# Patient Record
Sex: FEMALE | Race: WHITE | NOT HISPANIC OR LATINO | Employment: OTHER | URBAN - METROPOLITAN AREA
[De-identification: names, ages, dates, MRNs, and addresses within clinical notes are randomized per-mention and may not be internally consistent; named-entity substitution may affect disease eponyms.]

---

## 2017-01-05 NOTE — TELEPHONE ENCOUNTER
clopidogrel (PLAVIX) 75 MG tablet      Last Written Prescription Date: 4/6/16  Last Fill Quantity: 90, # refills: 3    Last Office Visit with [facility], [facility] or [facility] prescribing provider:  5/13/16   Future Office Visit:       WBC      7.2   4/6/2016  RBC     4.57   4/6/2016  HGB     14.4   4/6/2016  HCT     43.8   4/6/2016  No components found with this name: mct  MCV       96   4/6/2016  MCH     31.5   4/6/2016  MCHC     32.9   4/6/2016  RDW     13.9   4/6/2016  PLT      278   4/6/2016  AST       15   4/6/2016  ALT       22   4/6/2016  CREATININE   Date Value Ref Range Status   04/06/2016 0.90 0.52 - 1.04 mg/dL Final   ]    atorvastatin (LIPITOR) 40 MG tablet     Last Written Prescription Date: 4/6/16  Last Fill Quantity: 90, # refills: 3  Last Office Visit with [facility], [facility] or [facility] prescribing provider: 5/3/16       CHOL      198   4/6/2016  HDL       77   4/6/2016  LDL      100   4/6/2016  TRIG      104   4/6/2016  CHOLHDLRATIO      3.0   11/19/2014    Yvonne BROWN

## 2017-01-06 RX ORDER — ATORVASTATIN CALCIUM 40 MG/1
TABLET, FILM COATED ORAL
Qty: 90 TABLET | Refills: 3 | OUTPATIENT
Start: 2017-01-06

## 2017-01-06 RX ORDER — CLOPIDOGREL BISULFATE 75 MG/1
TABLET ORAL
Qty: 90 TABLET | Refills: 3 | OUTPATIENT
Start: 2017-01-06

## 2017-01-06 NOTE — TELEPHONE ENCOUNTER
"Have refill supply until April 2017  Refill available at pharmacy.  Request refused as \"duplicate\" sent back to pharmacy.      Laura Hutchinson RN        "

## 2017-03-28 ENCOUNTER — APPOINTMENT (OUTPATIENT)
Dept: MRI IMAGING | Facility: CLINIC | Age: 77
End: 2017-03-28
Attending: EMERGENCY MEDICINE
Payer: MEDICARE

## 2017-03-28 ENCOUNTER — HOSPITAL ENCOUNTER (EMERGENCY)
Facility: CLINIC | Age: 77
Discharge: HOME | End: 2017-03-28
Attending: EMERGENCY MEDICINE | Admitting: EMERGENCY MEDICINE
Payer: MEDICARE

## 2017-03-28 VITALS
TEMPERATURE: 97.8 F | HEART RATE: 69 BPM | RESPIRATION RATE: 14 BRPM | DIASTOLIC BLOOD PRESSURE: 67 MMHG | SYSTOLIC BLOOD PRESSURE: 114 MMHG | OXYGEN SATURATION: 99 %

## 2017-03-28 DIAGNOSIS — R19.7 DIARRHEA, UNSPECIFIED TYPE: ICD-10-CM

## 2017-03-28 DIAGNOSIS — G45.9 TRANSIENT CEREBRAL ISCHEMIA, UNSPECIFIED TYPE: ICD-10-CM

## 2017-03-28 DIAGNOSIS — R55 VAGAL REACTION: ICD-10-CM

## 2017-03-28 LAB
ALBUMIN SERPL-MCNC: 3.4 G/DL (ref 3.4–5)
ALP SERPL-CCNC: 68 U/L (ref 40–150)
ALT SERPL W P-5'-P-CCNC: 28 U/L (ref 0–50)
ANION GAP SERPL CALCULATED.3IONS-SCNC: 10 MMOL/L (ref 3–14)
AST SERPL W P-5'-P-CCNC: 25 U/L (ref 0–45)
BASOPHILS # BLD AUTO: 0 10E9/L (ref 0–0.2)
BASOPHILS NFR BLD AUTO: 0.2 %
BILIRUB SERPL-MCNC: 0.5 MG/DL (ref 0.2–1.3)
BUN SERPL-MCNC: 16 MG/DL (ref 7–30)
CALCIUM SERPL-MCNC: 8.6 MG/DL (ref 8.5–10.1)
CHLORIDE SERPL-SCNC: 105 MMOL/L (ref 94–109)
CO2 SERPL-SCNC: 25 MMOL/L (ref 20–32)
COHGB MFR BLD: 1 % (ref 0–2)
CREAT SERPL-MCNC: 1.07 MG/DL (ref 0.52–1.04)
DIFFERENTIAL METHOD BLD: ABNORMAL
EOSINOPHIL # BLD AUTO: 0.1 10E9/L (ref 0–0.7)
EOSINOPHIL NFR BLD AUTO: 1 %
ERYTHROCYTE [DISTWIDTH] IN BLOOD BY AUTOMATED COUNT: 14.3 % (ref 10–15)
GFR SERPL CREATININE-BSD FRML MDRD: 50 ML/MIN/1.7M2
GLUCOSE SERPL-MCNC: 106 MG/DL (ref 70–99)
HCT VFR BLD AUTO: 37.5 % (ref 35–47)
HGB BLD-MCNC: 12 G/DL (ref 11.7–15.7)
IMM GRANULOCYTES # BLD: 0 10E9/L (ref 0–0.4)
IMM GRANULOCYTES NFR BLD: 0.5 %
LACTATE BLD-SCNC: 1.6 MMOL/L (ref 0.7–2.1)
LYMPHOCYTES # BLD AUTO: 0.6 10E9/L (ref 0.8–5.3)
LYMPHOCYTES NFR BLD AUTO: 10.1 %
MCH RBC QN AUTO: 28.7 PG (ref 26.5–33)
MCHC RBC AUTO-ENTMCNC: 32 G/DL (ref 31.5–36.5)
MCV RBC AUTO: 90 FL (ref 78–100)
MONOCYTES # BLD AUTO: 0.5 10E9/L (ref 0–1.3)
MONOCYTES NFR BLD AUTO: 8.2 %
NEUTROPHILS # BLD AUTO: 5 10E9/L (ref 1.6–8.3)
NEUTROPHILS NFR BLD AUTO: 80 %
NRBC # BLD AUTO: 0 10*3/UL
NRBC BLD AUTO-RTO: 0 /100
PLATELET # BLD AUTO: 284 10E9/L (ref 150–450)
POTASSIUM SERPL-SCNC: 3.8 MMOL/L (ref 3.4–5.3)
PROT SERPL-MCNC: 7.1 G/DL (ref 6.8–8.8)
RBC # BLD AUTO: 4.18 10E12/L (ref 3.8–5.2)
SODIUM SERPL-SCNC: 140 MMOL/L (ref 133–144)
TROPONIN I SERPL-MCNC: NORMAL UG/L (ref 0–0.04)
WBC # BLD AUTO: 6.2 10E9/L (ref 4–11)

## 2017-03-28 PROCEDURE — 25000128 H RX IP 250 OP 636: Performed by: EMERGENCY MEDICINE

## 2017-03-28 PROCEDURE — 70553 MRI BRAIN STEM W/O & W/DYE: CPT

## 2017-03-28 PROCEDURE — 85025 COMPLETE CBC W/AUTO DIFF WBC: CPT | Performed by: EMERGENCY MEDICINE

## 2017-03-28 PROCEDURE — 84484 ASSAY OF TROPONIN QUANT: CPT | Performed by: EMERGENCY MEDICINE

## 2017-03-28 PROCEDURE — A9585 GADOBUTROL INJECTION: HCPCS | Performed by: RADIOLOGY

## 2017-03-28 PROCEDURE — 99285 EMERGENCY DEPT VISIT HI MDM: CPT | Mod: 25

## 2017-03-28 PROCEDURE — 93005 ELECTROCARDIOGRAM TRACING: CPT

## 2017-03-28 PROCEDURE — 82375 ASSAY CARBOXYHB QUANT: CPT | Performed by: EMERGENCY MEDICINE

## 2017-03-28 PROCEDURE — 80053 COMPREHEN METABOLIC PANEL: CPT | Performed by: EMERGENCY MEDICINE

## 2017-03-28 PROCEDURE — 36415 COLL VENOUS BLD VENIPUNCTURE: CPT | Performed by: EMERGENCY MEDICINE

## 2017-03-28 PROCEDURE — 70549 MR ANGIOGRAPH NECK W/O&W/DYE: CPT

## 2017-03-28 PROCEDURE — 25500064 ZZH RX 255 OP 636: Performed by: RADIOLOGY

## 2017-03-28 PROCEDURE — 70544 MR ANGIOGRAPHY HEAD W/O DYE: CPT | Mod: XS

## 2017-03-28 PROCEDURE — 96360 HYDRATION IV INFUSION INIT: CPT | Mod: 59

## 2017-03-28 RX ORDER — SODIUM CHLORIDE 9 MG/ML
1000 INJECTION, SOLUTION INTRAVENOUS CONTINUOUS
Status: DISCONTINUED | OUTPATIENT
Start: 2017-03-28 | End: 2017-03-28 | Stop reason: HOSPADM

## 2017-03-28 RX ORDER — GADOBUTROL 604.72 MG/ML
10 INJECTION INTRAVENOUS ONCE
Status: COMPLETED | OUTPATIENT
Start: 2017-03-28 | End: 2017-03-28

## 2017-03-28 RX ADMIN — GADOBUTROL 10 ML: 604.72 INJECTION INTRAVENOUS at 13:24

## 2017-03-28 RX ADMIN — SODIUM CHLORIDE 1000 ML: 9 INJECTION, SOLUTION INTRAVENOUS at 12:27

## 2017-03-28 ASSESSMENT — ENCOUNTER SYMPTOMS
COUGH: 1
NAUSEA: 0
CHILLS: 1
DIZZINESS: 1
HEADACHES: 0
ABDOMINAL PAIN: 1
VOMITING: 0
SHORTNESS OF BREATH: 0
DIARRHEA: 1

## 2017-03-28 NOTE — DISCHARGE INSTRUCTIONS
What is a TIA?  A TIA (Transient Ischemic Attack) is an early warning that a stroke (also called a brain attack) is coming. A TIA is a temporary stroke. It causes no lasting damage. But the effects of a stroke, if it happens, can be very serious and lasting. If you think you are having symptoms of a TIA or stroke--even if they don t last--get medical help right away.     If you have any symptoms of a TIA or stroke, call 911 and your doctor as soon as possible.     Symptoms of TIA and stroke  Symptoms may come on suddenly and last for a few seconds or a few hours. You may have symptoms only once. Or they may come and go for days. If you notice any of the following symptoms, don t wait. Call 911 or emergency services right away.    Weakness, numbness, tingling, or loss of feeling in your face, arm, or leg.    Trouble seeing in one or both eyes; double vision.    Slurred speech, trouble talking, or problems understanding others when they speak    Sudden, severe headache    Dizziness or a feeling of spinning    Loss of balance or falling    Blackouts    4822-5380 [employer]. [address] [place] [address]. All rights reserved. This information is not intended as a substitute for professional medical care. Always follow your healthcare professional's instructions.

## 2017-03-28 NOTE — ED AVS SNAPSHOT
[facility]    [address]    [facility] [address]    Phone:  [phone]    Fax:  [phone]                                       Julieta Holm   MRN: [id number]    Department:  [facility]   Date of Visit:  3/28/2017           After Visit Summary Signature Page     I have received my discharge instructions, and my questions have been answered. I have discussed any challenges I see with this plan with the nurse or doctor.    ..........................................................................................................................................  Patient/Patient Representative Signature      ..........................................................................................................................................  Patient Representative Print Name and Relationship to Patient    ..................................................               ................................................  Date                                            Time    ..........................................................................................................................................  Reviewed by Signature/Title    ...................................................              ..............................................  Date                                                            Time

## 2017-03-28 NOTE — ED NOTES
ABC's intact.  Alert and oriented x4.    Pt states she was having BM causing some dizziness.  Denies LOC.  Pt states this has happened before, but this time lost sight in L eye for 2-5 minutes.  Pt states all symptoms have resolved at this time.

## 2017-03-28 NOTE — ED AVS SNAPSHOT
[facility]    [address] [place] [address]    Phone:  [phone]    Fax:  [phone]                                       Julieta Holm   MRN: [id number]    Department:  [facility]   Date of Visit:  3/28/2017           Patient Information     Date Of Birth          1940        Your diagnoses for this visit were:     Transient cerebral ischemia, unspecified type     Diarrhea, unspecified type     Vagal reaction        You were seen by Billy Salazar MD.      Follow-up Information     Follow up with Inez Wallis PA-C In 1 day.    Specialty:  Physician Assistant    Contact information:    [facility]  [address] [facility] [id number] [phone]          Follow up with Anette Kenney MD In 3 days.    Specialty:  Neurology    Contact information:    [facility]  [address]  [facility] [id number] [phone]          Discharge Instructions         What is a TIA?  A TIA (Transient Ischemic Attack) is an early warning that a stroke (also called a brain attack) is coming. A TIA is a temporary stroke. It causes no lasting damage. But the effects of a stroke, if it happens, can be very serious and lasting. If you think you are having symptoms of a TIA or stroke--even if they don t last--get medical help right away.     If you have any symptoms of a TIA or stroke, call 911 and your doctor as soon as possible.     Symptoms of TIA and stroke  Symptoms may come on suddenly and last for a few seconds or a few hours. You may have symptoms only once. Or they may come and go for days. If you notice any of the following symptoms, don t wait. Call 911 or emergency services right away.    Weakness, numbness, tingling, or loss of feeling in your face, arm, or leg.    Trouble seeing in one or both eyes; double vision.    Slurred speech, trouble talking, or problems  understanding others when they speak    Sudden, severe headache    Dizziness or a feeling of spinning    Loss of balance or falling    Blackouts    6741-5446 [employer]. [address] [place] [address], [place] [address]. All rights reserved. This information is not intended as a substitute for professional medical care. Always follow your healthcare professional's instructions.          Future Appointments        Provider Department Dept Phone Center    4/7/2017 8:20 AM Conor Arenas MD [facility] [place] [facility] [phone]       24 Hour Appointment Hotline       To make an appointment at any [facility], call [phone] ([phone]). If you don't have a family doctor or clinic, we will help you find one. [facility] are conveniently located to serve the needs of you and your family.             Review of your medicines      START taking        Dose / Directions Last dose taken    aspirin 81 MG tablet   Dose:  81 mg   Quantity:  7 tablet        Take 1 tablet (81 mg) by mouth daily   Refills:  0          Our records show that you are taking the medicines listed below. If these are incorrect, please call your family doctor or clinic.        Dose / Directions Last dose taken    amLODIPine 2.5 MG tablet   Commonly known as:  NORVASC   Dose:  2.5 mg   Quantity:  90 tablet        Take 1 tablet (2.5 mg) by mouth daily   Refills:  3        atorvastatin 40 MG tablet   Commonly known as:  LIPITOR   Dose:  40 mg   Quantity:  90 tablet        Take 1 tablet (40 mg) by mouth daily   Refills:  3        calcium 600 MG tablet   Dose:  2 tablet   Quantity:  180 tablet        Take 2 tablets by mouth daily   Refills:  3        clopidogrel 75 MG tablet   Commonly known as:  PLAVIX   Dose:  75 mg   Quantity:  90 tablet        Take 1 tablet (75 mg) by mouth daily   Refills:  3        fish oil-omega-3 fatty acids 1000 MG capsule   Quantity:  60        1 daily   Refills:  11        losartan-hydrochlorothiazide  50-12.5 MG per tablet   Commonly known as:  HYZAAR   Dose:  1 tablet   Quantity:  90 tablet        Take 1 tablet by mouth daily   Refills:  3        FRANCOIS 128 2 % ophthalmic solution   Dose:  1 drop   Generic drug:  sodium chloride        Place 1 drop into both eyes At Bedtime.   Refills:  0        SYSTANE 0.4-0.3 % Soln ophthalmic solution   Dose:  1 drop   Generic drug:  polyethylene glycol 0.4%- propylene glycol 0.3%        Place 1 drop into both eyes 3 times daily.   Refills:  0        VITAMIN D3 PO   Dose:  2000 Units        Take 2,000 Units by mouth daily.   Refills:  0                Prescriptions were sent or printed at these locations (1 Prescription)                   Other Prescriptions                Printed at Department/Unit printer (1 of 1)         aspirin 81 MG tablet                Procedures and tests performed during your visit     CBC with platelets differential    Carbon monoxide    Cardiac Continuous Monitoring    Comprehensive metabolic panel    Lactic acid whole blood    MR Brain w/o & w Contrast    MR Head w/o Contrast Angiogram    MR Neck w/o & w Contrast Angiogram    Troponin I      Orders Needing Specimen Collection     None      Pending Results     No orders found from 3/26/2017 to 3/29/2017.            Pending Culture Results     No orders found from 3/26/2017 to 3/29/2017.             Test Results from your hospital stay     3/28/2017 12:46 PM - Interface, Mobile Captain Results      Component Results     Component Value Ref Range & Units Status    WBC 6.2 4.0 - 11.0 10e9/L Final    RBC Count 4.18 3.8 - 5.2 10e12/L Final    Hemoglobin 12.0 11.7 - 15.7 g/dL Final    Hematocrit 37.5 35.0 - 47.0 % Final    MCV 90 78 - 100 fl Final    MCH 28.7 26.5 - 33.0 pg Final    MCHC 32.0 31.5 - 36.5 g/dL Final    RDW 14.3 10.0 - 15.0 % Final    Platelet Count 284 150 - 450 10e9/L Final    Diff Method Automated Method  Final    % Neutrophils 80.0 % Final    % Lymphocytes 10.1 % Final    % Monocytes 8.2 %  Final    % Eosinophils 1.0 % Final    % Basophils 0.2 % Final    % Immature Granulocytes 0.5 % Final    Nucleated RBCs 0 0 /100 Final    Absolute Neutrophil 5.0 1.6 - 8.3 10e9/L Final    Absolute Lymphocytes 0.6 (L) 0.8 - 5.3 10e9/L Final    Absolute Monocytes 0.5 0.0 - 1.3 10e9/L Final    Absolute Eosinophils 0.1 0.0 - 0.7 10e9/L Final    Absolute Basophils 0.0 0.0 - 0.2 10e9/L Final    Abs Immature Granulocytes 0.0 0 - 0.4 10e9/L Final    Absolute Nucleated RBC 0.0  Final         3/28/2017  3:30 PM - Interface, Radiant Ib      Narrative     MRI OF THE BRAIN WITHOUT AND WITH CONTRAST 3/28/2017 1:38 PM     COMPARISON: Brain MR 4/6/2013.    HISTORY: Dizziness, left eye vision loss.     TECHNIQUE: Axial diffusion-weighted with ADC map, axial T2-weighted  with fat saturation, axial T1-weighted, axial turboFLAIR and coronal  T1-weighted images of the brain were acquired without intravenous  contrast.  Following intravenous administration of gadolinium (15 mL  Gadavist), axial T1-weighted images of the brain were acquired.     FINDINGS: There is mild diffuse cerebral volume loss. There are a few  small scattered focal areas of abnormal T2 signal hyperintensity in  the cerebral white matter bilaterally that are consistent with sequela  of chronic small vessel ischemic disease.    The ventricles and basal cisterns are within normal limits in  configuration given the degree of cerebral volume loss. There is no  midline shift. There are no extra-axial fluid collections.  There is  no evidence for stroke or acute intracranial hemorrhage. There is no  abnormal contrast enhancement in the brain or its coverings.    There is no sinusitis or mastoiditis.        Impression     IMPRESSION: Diffuse cerebral volume loss and cerebral white matter  changes consistent with chronic small vessel ischemic disease. No  evidence for acute intracranial pathology.    AMANDO MYERS MD         3/28/2017  3:30 PM - Interface, Radiant Ib       Narrative     MR ANGIOGRAM OF THE HEAD WITHOUT CONTRAST   3/28/2017 1:38 PM     COMPARISON: [facility] MRA 4/7/2013.    HISTORY: Left eye vision loss.    TECHNIQUE: 3D time-of-flight MR angiogram of the head without  contrast. MIP reconstruction of all MR angiographic data was  performed.    FINDINGS: Moderate atherosclerotic narrowing of the right carotid  terminus and proximal M1 segment of the right middle cerebral artery  again noted. Mild-moderate atherosclerotic narrowing of the  supraclinoid distal left internal carotid artery again noted. Flow in  the intracranial distal left vertebral artery appears to be diminished  from the comparison study possibly representing worsening  atherosclerotic narrowing. The basilar, bilateral anterior cerebral,  left middle cerebral and bilateral posterior cerebral arteries are  patent and unremarkable with no evidence for cerebral artery stenosis  or aneurysm. The anterior communicating artery is patent and  unremarkable.         Impression     IMPRESSION:  1. Moderate atherosclerotic narrowing of the right carotid terminus  and proximal M1 segment of the right middle cerebral artery again  noted without change.  2. Mild-moderate atherosclerotic narrowing of the supraclinoid distal  left internal carotid artery again noted without change.  3. Otherwise, normal [facility].    AMANDO MYERS MD         3/28/2017  3:30 PM - Interface, Radiant Ib      Narrative     MRA NECK WITHOUT AND WITH CONTRAST  3/28/2017 1:38 PM     COMPARISON: Neck MRA 4/7/2013.    HISTORY: Dizzy, left eye vision loss.    TECHNIQUE: 2D time-of-flight MR angiogram of the neck without contrast  and 3D MR angiogram of the neck with 15 mL Gadavist gadolinium IV  contrast.  MIP reconstruction of all MR angiographic data was  performed. Estimates of carotid stenoses are made relative to the  distal internal carotid artery diameters except as noted.    FINDINGS:    Carotids: The common carotid  arteries bilaterally are widely patent.  Moderate atherosclerotic narrowing (likely greater than 70% luminal  diameter stenosis) at the origin of the right internal carotid artery  again noted. Mild atherosclerotic narrowing (less than 50% luminal  diameter stenosis) at the origin of the left internal carotid artery  again noted. The cervical internal carotid arteries bilaterally are  otherwise patent and unremarkable.    Vertebrals: Multiple foci of narrowing of the intradural distal left  vertebral artery again noted, likely representing areas of  atherosclerotic narrowing. The vertebral arteries bilaterally are  otherwise patent and unremarkable.    Aortic arch: The arteries as they arise from the aortic arch are  normally arranged with no evidence for stenosis.        Impression     IMPRESSION:  1. Moderate atherosclerotic narrowing at the origin of the right  internal carotid artery again noted without change.  2. Mild atherosclerotic narrowing at the origin of the left internal  carotid artery again noted without change.  3. Multiple foci of atherosclerotic narrowing of the intradural distal  left vertebral artery again noted.  4. Otherwise, normal neck MRA. No change from the comparison study.    AMANDO MYERS MD         3/28/2017  1:18 PM - Interface, Flexilab Results      Component Results     Component Value Ref Range & Units Status    Sodium 140 133 - 144 mmol/L Final    Potassium 3.8 3.4 - 5.3 mmol/L Final    Chloride 105 94 - 109 mmol/L Final    Carbon Dioxide 25 20 - 32 mmol/L Final    Anion Gap 10 3 - 14 mmol/L Final    Glucose 106 (H) 70 - 99 mg/dL Final    Urea Nitrogen 16 7 - 30 mg/dL Final    Creatinine 1.07 (H) 0.52 - 1.04 mg/dL Final    GFR Estimate 50 (L) >60 mL/min/1.7m2 Final    Non  GFR Calc    GFR Estimate If Black 61 >60 mL/min/1.7m2 Final    African American GFR Calc    Calcium 8.6 8.5 - 10.1 mg/dL Final    Bilirubin Total 0.5 0.2 - 1.3 mg/dL Final    Albumin 3.4 3.4 -  5.0 g/dL Final    Protein Total 7.1 6.8 - 8.8 g/dL Final    Alkaline Phosphatase 68 40 - 150 U/L Final    ALT 28 0 - 50 U/L Final    AST 25 0 - 45 U/L Final         3/28/2017 12:46 PM - Interface, Flexilab Results      Component Results     Component Value Ref Range & Units Status    Lactic Acid 1.6 0.7 - 2.1 mmol/L Final         3/28/2017  1:18 PM - Interface, Flexilab Results      Component Results     Component Value Ref Range & Units Status    Troponin I ES  0.000 - 0.045 ug/L Final    <0.015  The 99th percentile for upper reference range is 0.045 ug/L.  Troponin values in   the range of 0.045 - 0.120 ug/L may be associated with risks of adverse   clinical events.           3/28/2017  3:16 PM - Interface, Flexilab Results      Component Results     Component Value Ref Range & Units Status    Carbon Monoxide 1.0 0 - 2 % Final                Clinical Quality Measure: Blood Pressure Screening     Your blood pressure was checked while you were in the emergency department today. The last reading we obtained was  BP: 125/67 . Please read the guidelines below about what these numbers mean and what you should do about them.  If your systolic blood pressure (the top number) is less than 120 and your diastolic blood pressure (the bottom number) is less than 80, then your blood pressure is normal. There is nothing more that you need to do about it.  If your systolic blood pressure (the top number) is 120-139 or your diastolic blood pressure (the bottom number) is 80-89, your blood pressure may be higher than it should be. You should have your blood pressure rechecked within a year by a primary care provider.  If your systolic blood pressure (the top number) is 140 or greater or your diastolic blood pressure (the bottom number) is 90 or greater, you may have high blood pressure. High blood pressure is treatable, but if left untreated over time it can put you at risk for heart attack, stroke, or kidney failure. You should  "have your blood pressure rechecked by a primary care provider within the next 4 weeks.  If your provider in the emergency department today gave you specific instructions to follow-up with your doctor or provider even sooner than that, you should follow that instruction and not wait for up to 4 weeks for your follow-up visit.        Thank you for choosing [facility]       Thank you for choosing [facility] for your care. Our goal is always to provide you with excellent care. Hearing back from our patients is one way we can continue to improve our services. Please take a few minutes to complete the written survey that you may receive in the mail after you visit with us. Thank you!        [email or web address] Information     [email or web address] lets you send messages to your doctor, view your test results, renew your prescriptions, schedule appointments and more. To sign up, go to [email or web address] . Click on \"Log in\" on the left side of the screen, which will take you to the Welcome page. Then click on \"Sign up Now\" on the right side of the page.     You will be asked to enter the access code listed below, as well as some personal information. Please follow the directions to create your username and password.     Your access code is: [email or web address]  Expires: 2017  4:21 PM     Your access code will  in 90 days. If you need help or a new code, please call your [facility] clinic or [phone].        Care EveryWhere ID     This is your Care EveryWhere ID. This could be used by other organizations to access your [facility] medical records  [id number]        After Visit Summary       This is your record. Keep this with you and show to your community pharmacist(s) and doctor(s) at your next visit.                  "

## 2017-03-28 NOTE — ED NOTES
Assumed care patient lying in bed waiting for results of test. States she feels good no pain,numbness,tingling blurred vision or dizziness.

## 2017-03-28 NOTE — ED PROVIDER NOTES
History     Chief Complaint:  Dizziness      HPI   Julieta Holm is an anticoagulated 76 year old female who presents to the emergency department today via EMS for evaluation of dizziness. She has a history of hypertension, dyslipidemia, osteopenia, rheumatic fever without definite heart involvement and a cerebellar stroke that she suffered in March 2013 that was associated with left vertebral artery stenosis. She had required two back-to-back hospitalizations for this diagnosis and ultimately was treated on Plavix. Today, the patient states that she was at the [employer] playing cards when she began experiencing abdominal cramps, and proceeded to go to the bathroom. The patient states that she had a non-bloody, non-watery loose stool that is described as being rather large. The patient states that she felt clammy, cold and dizzy, which was followed by a 5 minute episode of complete vision loss in the left eye at around 1020 this morning, prompting EMS to be called. Upon evaluation, the patient endorses normal vision. The patient adds that she has been taking Tylenol for a cough and congestion. The patient's  adds that he has had similar symptoms, and was evaluated in clinic 4 days ago, and was started on a course of antibiotic. The patient denies nausea, vomiting and headache. The patient denies shortness of breath. Of note, the patient states that she has had multiple episodes of spotty vision with flashing lights prior to the onset of a migraine.       Allergies:  Lisinopril   Nabumetone      Medications:    Clopidogrel (plavix) 75 mg tablet  Amlodipine (norvasc) 2.5 mg tablet  Losartan-hydrochlorothiazide (hyzaar) 50-12.5 mg per tablet  Atorvastatin (lipitor) 40 mg tablet  Calcium 600 mg tablet  Cholecalciferol (vitamin d3 po)  Sodium chloride (florecita 128) 2 % ophthalmic solution  Polyethylene glycol - propylene glycol (systane) 0.4-0.3 % soln  Fish oil 1000 mg or caps     Past Medical History:     Cerebellar stroke, acute (H)   Chronic constipation   CKD (chronic kidney disease) stage 3, GFR 30-59 ml/min   Essential hypertension, benign   Mixed hyperlipidemia   KELLY (obstructive sleep apnea)   Osteopenia   Other chest pain   Rheumatic fever without mention of heart involvement   Vertebral artery stenosis     Past Surgical History:    C vaginal hysterectomy   Menometrorrhagia  Cataract iol, rt/lt bilateral  Colonoscopy   Hc dilation/curettage diag/ther non ob   Hc laparoscopy, surgical; cholecystectomy   Cholecystectomy, laparoscopic  Hysterectomy, pap no longer indicated   Tonsillectomy     Family History:    CAD: father   Type II diabetes: mother   Peripheral vascular disease: mother  Cerebrovascular disease: mother   Hypertension: mother   Obesity: mother   Hyperlipidemia: son     Social History:  The patient was accompanied to the ED by EMS and family.  Smoking Status: never  Smokeless Tobacco: never  Alcohol Use: positive  Marital Status:   [2]     Review of Systems   Constitutional: Positive for chills.        Positive for clamminess.   HENT: Positive for congestion.    Eyes: Positive for visual disturbance (vision loss, left).   Respiratory: Positive for cough. Negative for shortness of breath.    Gastrointestinal: Positive for abdominal pain (cramping) and diarrhea. Negative for nausea and vomiting.   Neurological: Positive for dizziness. Negative for headaches.   All other systems reviewed and are negative.    Physical Exam   Vitals:  Patient Vitals for the past 24 hrs:   BP Temp Temp src Pulse Heart Rate Resp SpO2   03/28/17 1627 114/67 - - 69 - 14 99 %   03/28/17 1503 125/67 - - - - - 99 %   03/28/17 1400 (!) 153/98 - - - 65 - 95 %   03/28/17 1215 104/67 - - - 62 - -   03/28/17 1200 108/59 - - - 63 - 98 %   03/28/17 1144 104/63 97.8  F (36.6  C) Temporal 61 - 16 98 %     Physical Exam  Constitutional:  Appears well-developed and well-nourished. Alert. Conversant. Non toxic.  HENT:   Head:  Atraumatic. No depressed skull fracture, [place] Eyes, Fan's sign, or hemotympanum. Face normal.  Nose: Nose normal.  Mouth/Throat: Oral mucosa is clear and moist. no trismus. Pharynx normal. Tonsils symmetric. No tonsillar enlargement, erythema, or exudate.  Eyes: Conjunctivae normal. EOM normal. Pupils equal, round, and reactive to light. No scleral icterus.   Neck: Normal range of motion. Neck supple. No tracheal deviation present.   Cardiovascular: Normal rate, regular rhythm. No gallop. No friction rub. No murmur heard. Symmetric radial artery pulses   Pulmonary/Chest: Effort normal. No stridor. No respiratory distress. No wheezes. No rales. No rhonchi . No tenderness.   Abdominal: Soft. Bowel sounds normal. No distension. No mass. No tenderness. No rebound. No guarding.   Musculoskeletal: Normal range of motion. No edema. No tenderness. No deformity   Lymph: No cervical adenopathy.   Neurological: Alert and oriented to person, place, and time. Cranial Nerves intact II-XII except I did not formally test gag or visual acuity.  EOMI. Strength 5/5 bilaterally in the trapezius, deltoid, biceps, triceps, , thumb opposition, finger abduction, psoas, quadriceps, hamstring, gastrocnemius, tibialis anterior. Sensation intact to light touch in all 4 extremities (C4-T1 and L4-S1). Finger to nose and coordination normal. Mental status normal. Attention normal. GCS 15. Memory normal. Speech fluent. Cognition normal. Gait normal. NIHSS = 0  Skin: Skin is warm and dry. No rash noted. No pallor. Normal capillary refill.  Psychiatric:  Normal mood. Normal affect.     Emergency Department Course     ECG:  ECG taken at 1146, ECG read at 1157  Sinus bradycardia   Nonspecific ST and T wave abnormality   Abnormal ECG  Rate 57 bpm. MT interval 166. QRS duration 80. QT/QTc 428/416. P-R-T axes 48 1 5.      Imaging:  Radiology findings were communicated with the patient who voiced understanding of the findings.    MR Neck w/o & w  Contrast Angiogram  IMPRESSION:  1. Moderate atherosclerotic narrowing at the origin of the right  internal carotid artery again noted without change.  2. Mild atherosclerotic narrowing at the origin of the left internal  carotid artery again noted without change.  3. Multiple foci of atherosclerotic narrowing of the intradural distal  left vertebral artery again noted.  4. Otherwise, normal neck MRA. No change from the comparison study.  Reading per radiology    MR Head w/o Contrast Angiogram  IMPRESSION:  1. Moderate atherosclerotic narrowing of the right carotid terminus  and proximal M1 segment of the right middle cerebral artery again  noted without change.  2. Mild-moderate atherosclerotic narrowing of the supraclinoid distal  left internal carotid artery again noted without change.  3. Otherwise, normal [place] MRA.  Reading per radiology    MR Brain w/o & w Contrast  IMPRESSION: Diffuse cerebral volume loss and cerebral white matter  changes consistent with chronic small vessel ischemic disease. No  evidence for acute intracranial pathology.  Reading per radiology      Laboratory:  Laboratory findings were communicated with the patient who voiced understanding of the findings.    CBC: AWNL. (WBC 6.2, HGB 12.0, )   CMP: glucose 106 (H), creatinine 1.07 (H), GFR estimate 50 (L) o/w WNL.   Carbon monoxide: 1.0  Lactic Acid (Collected at 1225): 1.6  Troponin (Collected 1225): <0.015      Interventions:  1227: NS 1000 mL IV   1511: NS 1000 mL IV        Emergency Department Course:  Nursing notes and vitals reviewed.  I performed an exam of the patient as documented above.   1146: EKG obtained in the ED, see results above.   IV was inserted and blood was drawn for laboratory testing, results above.  The patient was sent for a MR brain, MR head, MR neck while in the emergency department, results above.   1615: I spoke with Dr. Dyson of the Neurology service from [facility] regarding  patient's presentation, findings, and plan of care.  The patient was rechecked and was updated on the results of her laboratory and imaging studies.   I discussed the treatment plan with the patient. They expressed understanding of this plan and consented to discharge. They will be discharged home with instructions for care and follow up. In addition, the patient will return to the emergency department if their symptoms persist, worsen, if new symptoms arise or if there is any concern.  All questions were answered.  I personally reviewed the laboratory and imaging results with the Patient and answered all related questions prior to discharge.    Impression & Plan      Medical Decision Making:  Julieta Holm is a 76 year old female with a history of cerebellar infarct and left vertebral artery stenosis who presents to the emergency department today with clinical history suggestive of likely infectious, watery diarrhea without evidence of GI bleeding that lead to a vagal event with hypotension and subsequent transient vision loss in her left eye. I suspect that her hypotension could have likely triggered a TIA. At this point, because she was not on a monitor during the event, we could not completely exclude the possibility of a run of atrial fibrillation or some other dysrhythmia. However, her presentation is most consistent with vasovagal.     We did do lab work up here in the ED, which is reassuring. We repeated MRI and MRA of her head and neck to look for new strokes (fortunately, there are none) or any arthrosclerotic occlusive disease (there are also none, fortunately).     We monitored the patient in the ED for a few hours and she had no recurrent symptoms or irregular rhythms. At this point, she would prefer to go home with her .     I discussed the patient will Dr. Dyson of Neurology. She feels that the patient may be reasonable for outpatient management, but at this point we have not definitely  ruled ou paroxsymal atrial fibrillation. The patient is already on Plavix, so we will add baby Aspirin to her regimen. She will follow up with [facility] for an outpatient TIA work up. She had seen Dr. Kenney in the past, so Dr. Dyson recommends that the patient follow up with her.     I discussed the plan of care with the patient and her  and they are in agreement. All questions answered prior to discharge.       Diagnosis:    ICD-10-CM    1. Transient cerebral ischemia, unspecified type G45.9    2. Diarrhea, unspecified type R19.7    3. Vagal reaction R55        Disposition:   The patient is discharged home.       Discharge Medications:     Details   aspirin 81 MG tablet Take 1 tablet (81 mg) by mouth daily, Disp-7 tablet, R-0, Local Print             Scribe Disclosure:  ANGIE, [place] Man, am serving as a scribe at 11:53 AM on 3/28/2017 to document services personally performed by Billy Salazar MD, based on my observations and the provider's statements to me.    3/28/2017   [facility] EMERGENCY DEPARTMENT       Billy Salazar MD  03/28/17 5874

## 2017-03-29 ENCOUNTER — OFFICE VISIT (OUTPATIENT)
Dept: FAMILY MEDICINE | Facility: CLINIC | Age: 77
End: 2017-03-29
Payer: COMMERCIAL

## 2017-03-29 VITALS
SYSTOLIC BLOOD PRESSURE: 114 MMHG | OXYGEN SATURATION: 97 % | BODY MASS INDEX: 31.76 KG/M2 | HEART RATE: 95 BPM | TEMPERATURE: 97.9 F | DIASTOLIC BLOOD PRESSURE: 78 MMHG | WEIGHT: 185 LBS

## 2017-03-29 DIAGNOSIS — G45.3 AMAUROSIS FUGAX: Primary | ICD-10-CM

## 2017-03-29 LAB — INTERPRETATION ECG - MUSE: NORMAL

## 2017-03-29 PROCEDURE — 99214 OFFICE O/P EST MOD 30 MIN: CPT | Performed by: FAMILY MEDICINE

## 2017-03-29 NOTE — MR AVS SNAPSHOT
"              After Visit Summary   3/29/2017    Julieta Holm    MRN: [id number]           Patient Information     Date Of Birth          1940        Visit Information        Provider Department      3/29/2017 9:20 AM Conor Arenas MD [facility]        Today's Diagnoses     Amaurosis fugax    -  1    Screen for colon cancer        Asymptomatic postmenopausal status           Follow-ups after your visit        Your next 10 appointments already scheduled     Apr 07, 2017  8:20 AM CDT   PHYSICAL with Conor Arenas MD   [facility]    [address] [place] [id number]   [phone]              Future tests that were ordered for you today     Open Future Orders        Priority Expected Expires Ordered    Cardiac Event Monitor - Peds/Adult Routine  5/13/2017 3/29/2017            Who to contact     If you have questions or need follow up information about today's clinic visit or your schedule please contact [facility] directly at [phone].  Normal or non-critical lab and imaging results will be communicated to you by TravelCLICKhart, letter or phone within 4 business days after the clinic has received the results. If you do not hear from us within 7 days, please contact the clinic through TravelCLICKhart or phone. If you have a critical or abnormal lab result, we will notify you by phone as soon as possible.  Submit refill requests through joblocal or call your pharmacy and they will forward the refill request to us. Please allow 3 business days for your refill to be completed.          Additional Information About Your Visit        MyChart Information     joblocal lets you send messages to your doctor, view your test results, renew your prescriptions, schedule appointments and more. To sign up, go to [email or web address] . Click on \"Log in\" on the left side of the screen, which will take you to the Welcome page. " "Then click on \"Sign up Now\" on the right side of the page.     You will be asked to enter the access code listed below, as well as some personal information. Please follow the directions to create your username and password.     Your access code is: [email or web address]  Expires: 2017  4:21 PM     Your access code will  in 90 days. If you need help or a new code, please call your [facility] clinic or [phone].        Care EveryWhere ID     This is your Care EveryWhere ID. This could be used by other organizations to access your [facility] medical records  [id number]        Your Vitals Were     Pulse Temperature Pulse Oximetry BMI (Body Mass Index)          95 97.9  F (36.6  C) (Oral) 97% 31.76 kg/m2         Blood Pressure from Last 3 Encounters:   17 114/78   17 114/67   16 122/80    Weight from Last 3 Encounters:   17 185 lb (83.9 kg)   16 193 lb (87.5 kg)   16 189 lb (85.7 kg)               Primary Care Provider Office Phone # Fax #    Conor Arenas [phone]       [facility] [place] [address] [place] [address] DR  [place] [address]        Thank you!     [facility] you [facility]  [facility]. Our goal is always to provide you with excellent care. Hearing back from our patients is one way we can continue to improve our services. Please take a few minutes to complete the written survey that you may receive in the mail after your visit with us. Thank you!             Your Updated Medication List - Protect others around you: Learn how to safely use, store and throw away your medicines at www.disposemymeds.org.          This list is accurate as of: 3/29/17  9:48 AM.  Always use your most recent med list.                   Brand Name Dispense Instructions for use    amLODIPine 2.5 MG tablet    NORVASC    90 tablet    Take 1 tablet (2.5 mg) by mouth daily       aspirin 81 MG tablet     7 tablet    Take 1 tablet (81 mg) by mouth " daily       atorvastatin 40 MG tablet    LIPITOR    90 tablet    Take 1 tablet (40 mg) by mouth daily       calcium 600 MG tablet     180 tablet    Take 2 tablets by mouth daily       clopidogrel 75 MG tablet    PLAVIX    90 tablet    Take 1 tablet (75 mg) by mouth daily       fish oil-omega-3 fatty acids 1000 MG capsule     60    1 daily       losartan-hydrochlorothiazide 50-12.5 MG per tablet    HYZAAR    90 tablet    Take 1 tablet by mouth daily       FRANCOIS 128 2 % ophthalmic solution   Generic drug:  sodium chloride      Place 1 drop into both eyes At Bedtime.       SYSTANE 0.4-0.3 % Soln ophthalmic solution   Generic drug:  polyethylene glycol 0.4%- propylene glycol 0.3%      Place 1 drop into both eyes 3 times daily.       VITAMIN D3 PO      Take 2,000 Units by mouth daily.

## 2017-03-29 NOTE — PROGRESS NOTES
SUBJECTIVE:                                                    Julieta Holm is a 76 year old female who presents to clinic today for the following health issues:      ED/UC Followup:    Facility:  [facility]    Date of visit: 3/28/2017  Reason for visit: vision loss in left eye for approximately 5 minutes  Current Status: resolved     ER records reviewed. Patient had vision loss in the left eye for about 5 minutes yesterday morning. She has history of stroke in the past. She has been on Plavix. Yesterday after workup done in the ER including MRA brain and neck and MRI of the brain, which was negative, she was asked to start aspirin. She was discharged home. She is yet to make an appointment with the neurologist, but has placed a call to them already. Denies any recurrent symptoms.  Denies any headaches, dizziness. Denies any bouts of diarrhea, nausea or vomiting. Reports of normal appetite. No weight changes.    Patient reports that when she takes her blood pressure daily, once in a while she notices a mention of irregular heartbeat on the monitor. It occurs approximately once a month. She is never felt palpitations or arrhythmia. Denies any shortness of breath or chest pain.        Problem list and histories reviewed & adjusted, as indicated.  Additional history: as documented    Patient Active Problem List   Diagnosis     Advanced directives, counseling/discussion     Osteopenia     Essential hypertension, benign     S/P vaginal hysterectomy     Vertebral artery stenosis     Hyperlipidemia LDL goal <100     History of ischemic vertebrobasilar artery cerebellar stroke     Chronic constipation     Obesity (BMI 30.0-34.9)     CKD (chronic kidney disease) stage 3, GFR 30-59 ml/min     HTN, goal below 140/90     Rectocele     Past Surgical History:   Procedure Laterality Date     C VAGINAL HYSTERECTOMY  1982    menometrorrhagia.  Had a laparoscpy prior to this procedure     CATARACT IOL, RT/LT  9/2008     bilateral     COLONOSCOPY  2001    patient told to repeat in 10 years     HC DILATION/CURETTAGE DIAG/THER NON OB      D&C's     HC LAPAROSCOPY, SURGICAL; CHOLECYSTECTOMY      Cholecystectomy, Laparoscopic     HYSTERECTOMY, PAP NO LONGER INDICATED       TONSILLECTOMY  89       Social History   Substance Use Topics     Smoking status: Never Smoker     Smokeless tobacco: Never Used     Alcohol use Yes      Comment: few per year     Family History   Problem Relation Age of Onset     C.A.D. Father       age 64 sudden death     DIABETES Mother      type 2     Cardiovascular Mother      peripheral vascular disease     CEREBROVASCULAR DISEASE Mother      onset age 80s     Hypertension Mother      Obesity Mother      HEART DISEASE Other      grandson with tetrology of Fallot     Lipids Son          Current Outpatient Prescriptions   Medication Sig Dispense Refill     aspirin 81 MG tablet Take 1 tablet (81 mg) by mouth daily 7 tablet 0     clopidogrel (PLAVIX) 75 MG tablet Take 1 tablet (75 mg) by mouth daily 90 tablet 3     amLODIPine (NORVASC) 2.5 MG tablet Take 1 tablet (2.5 mg) by mouth daily 90 tablet 3     losartan-hydrochlorothiazide (HYZAAR) 50-12.5 MG per tablet Take 1 tablet by mouth daily 90 tablet 3     atorvastatin (LIPITOR) 40 MG tablet Take 1 tablet (40 mg) by mouth daily 90 tablet 3     calcium 600 MG tablet Take 2 tablets by mouth daily 180 tablet 3     Cholecalciferol (VITAMIN D3 PO) Take 2,000 Units by mouth daily.       sodium chloride (FRANCOIS 128) 2 % ophthalmic solution Place 1 drop into both eyes At Bedtime.       polyethylene glycol - propylene glycol (SYSTANE) 0.4-0.3 % SOLN Place 1 drop into both eyes 3 times daily.       FISH OIL 1000 MG OR CAPS 1 daily 60 11     Allergies   Allergen Reactions     Lisinopril      Cough       Nabumetone GI Disturbance       Reviewed and updated as needed this visit by clinical staff  Allergies  Meds       Reviewed and updated as needed this visit by  Provider         ROS:  C: NEGATIVE for fever, chills, change in weight  E/M: NEGATIVE for ear, mouth and throat problems  R: NEGATIVE for significant cough or SOB  CV: NEGATIVE for chest pain, palpitations or peripheral edema    OBJECTIVE:                                                    /78 (BP Location: Right arm, Cuff Size: Adult Regular)  Pulse 95  Temp 97.9  F (36.6  C) (Oral)  Wt 185 lb (83.9 kg)  SpO2 97%  BMI 31.76 kg/m2  Body mass index is 31.76 kg/(m^2).   GENERAL: healthy, alert, well nourished, well hydrated, no distress  EYES: Eyes grossly normal to inspection, extraocular movements - intact, and PERRL  HENT: ear canals- normal; TMs- normal; Nose- normal; Mouth- no ulcers, no lesions  NECK: no tenderness, no adenopathy, no asymmetry, no masses, no stiffness; thyroid- normal to palpation  RESP: lungs clear to auscultation - no rales, no rhonchi, no wheezes  CV: regular rates and rhythm, normal S1 S2, no S3 or S4 and no murmur, no click or rub -  ABDOMEN: soft, no tenderness, no  hepatosplenomegaly, no masses, normal bowel sounds  MS: extremities- no gross deformities noted, no edema  NEURO: strength and tone- normal, sensory exam- grossly normal, mentation- intact, speech- normal, reflexes- symmetric         ASSESSMENT/PLAN:                                                        1. Amaurosis fugax  Patient had an episode of a TIA yesterday. Complete resolution of the vision loss on the left side.  Workup in the ER was negative for any signs of stroke. She was discharged home in a stable condition. Vitals are stable.  Recommended to check for any arrhythmia with a event monitor.  Follow up with neurology. Resume aspirin with Plavix. If any recurrence of symptoms noted, instructed to seek immediate medical attention.  Follow-up for an annual physical examination next month    - Cardiac Event Monitor - Peds/Adult; Future       Conor Arenas MD  [facility]

## 2017-03-30 ENCOUNTER — MEDICAL CORRESPONDENCE (OUTPATIENT)
Dept: HEALTH INFORMATION MANAGEMENT | Facility: CLINIC | Age: 77
End: 2017-03-30

## 2017-03-30 ENCOUNTER — TRANSFERRED RECORDS (OUTPATIENT)
Dept: HEALTH INFORMATION MANAGEMENT | Facility: CLINIC | Age: 77
End: 2017-03-30

## 2017-03-31 ENCOUNTER — ALLIED HEALTH/NURSE VISIT (OUTPATIENT)
Dept: CARDIOLOGY | Facility: CLINIC | Age: 77
End: 2017-03-31
Attending: FAMILY MEDICINE
Payer: MEDICARE

## 2017-03-31 DIAGNOSIS — G45.9 TIA (TRANSIENT ISCHEMIC ATTACK): Primary | ICD-10-CM

## 2017-03-31 DIAGNOSIS — R73.09 ELEVATED GLUCOSE: ICD-10-CM

## 2017-03-31 DIAGNOSIS — G45.3 AMAUROSIS FUGAX: ICD-10-CM

## 2017-03-31 LAB
CHOLEST SERPL-MCNC: 154 MG/DL
DEPRECATED CALCIDIOL+CALCIFEROL SERPL-MC: 70 UG/L (ref 20–75)
HBA1C MFR BLD: 5.9 % (ref 4.3–6)
HDLC SERPL-MCNC: 56 MG/DL
LDLC SERPL CALC-MCNC: 71 MG/DL
NONHDLC SERPL-MCNC: 98 MG/DL
T4 FREE SERPL-MCNC: 1.25 NG/DL (ref 0.76–1.46)
TRIGL SERPL-MCNC: 134 MG/DL
TSH SERPL DL<=0.05 MIU/L-ACNC: 1.73 MU/L (ref 0.4–4)
VIT B12 SERPL-MCNC: 951 PG/ML (ref 193–986)

## 2017-03-31 PROCEDURE — 93228 REMOTE 30 DAY ECG REV/REPORT: CPT | Performed by: INTERNAL MEDICINE

## 2017-03-31 PROCEDURE — 40000650 ZZH STATISTIC LIFEWATCH ACT MONITOR: Mod: ZF

## 2017-03-31 PROCEDURE — 93270 REMOTE 30 DAY ECG REV/REPORT: CPT | Mod: ZF

## 2017-03-31 NOTE — NURSING NOTE
Per Dr. Conor Arenas, patient to have 30 day cardionet monitor placed.  Diagnosis: Amaurosis  Monitor placed: Yes  Patient Instructed: Yes  Patient verbalized understanding: Yes  Holter # [id number]  Card ID: [id number]        Placed by BRIANDA Coyle

## 2017-03-31 NOTE — MR AVS SNAPSHOT
After Visit Summary   3/31/2017    Julieta Holm    MRN: [id number]           Patient Information     Date Of Birth          1940        Visit Information        Provider Department      3/31/2017 9:00 AM Tech, U[facility] Cvc Monitor, [facility]        Today's Diagnoses     Amaurosis fugax           Follow-ups after your visit        Your next 10 appointments already scheduled     Mar 31, 2017  9:30 AM CDT   LAB with [facility]   [facility] ([facility])    [address] [address] [phone]           Patient must bring picture ID.  Patient should be prepared to give a urine specimen  Please do not eat 10-12 hours before your appointment if you are coming in fasting for labs on lipids, cholesterol, or glucose (sugar).  Pregnant women should follow their Care Team instructions. Water with medications is okay. Do not drink coffee or other fluids.   If you have concerns about taking  your medications, please ask at office or if scheduling via Springr, send a message by clicking on Secure Messaging, Message Your Care Team.            Apr 07, 2017  8:20 AM CDT   PHYSICAL with Conor Arenas MD   [facility]    [address] [place] [address] [phone]              Future tests that were ordered for you today     Open Future Orders        Priority Expected Expires Ordered    Echo Complete Bubble Routine  3/31/2018 3/31/2017            Who to contact     If you have questions or need follow up information about today's clinic visit or your schedule please contact [facility] directly at [phone].  Normal or non-critical lab and imaging results will be communicated to you by MyChart, letter or phone within 4 business days after the clinic has received the results. If you do not hear from us within 7 days, please contact the clinic through [employer] or  "phone. If you have a critical or abnormal lab result, we will notify you by phone as soon as possible.  Submit refill requests through TextÃ¡do or call your pharmacy and they will forward the refill request to us. Please allow 3 business days for your refill to be completed.          Additional Information About Your Visit        [employer] Information     Text[employer]do lets you send messages to your doctor, view your test results, renew your prescriptions, schedule appointments and more. To sign up, go to [email or web address] . Click on \"Log in\" on the left side of the screen, which will take you to the Welcome page. Then click on \"Sign up Now\" on the right side of the page.     You will be asked to enter the access code listed below, as well as some personal information. Please follow the directions to create your username and password.     Your access code is: [email or web address]  Expires: 2017  4:21 PM     Your access code will  in 90 days. If you need help or a new code, please call your [place] clinic or [phone].        Care EveryWhere ID     This is your Care EveryWhere ID. This could be used by other organizations to access your [facility] medical records  [id number]         Blood Pressure from Last 3 Encounters:   17 114/78   17 114/67   16 122/80    Weight from Last 3 Encounters:   17 83.9 kg (185 lb)   16 87.5 kg (193 lb)   16 85.7 kg (189 lb)              We Performed the Following     Cardiac Event Monitor - Peds/Adult        Primary Care Provider Office Phone # Fax #    Conor Arenas [phone]       [facility] [place] [address]  [place] [address]        Thank you!     Thank you for choosing [facility]  for your care. Our goal is always to provide you with excellent care. Hearing back from our patients is one way we can continue to improve our services. Please take a few minutes to complete the written survey " that you may receive in the mail after your visit with us. Thank you!             Your Updated Medication List - Protect others around you: Learn how to safely use, store and throw away your medicines at www.disposemymeds.org.          This list is accurate as of: 3/31/17  9:24 AM.  Always use your most recent med list.                   Brand Name Dispense Instructions for use    amLODIPine 2.5 MG tablet    NORVASC    90 tablet    Take 1 tablet (2.5 mg) by mouth daily       aspirin 81 MG tablet     7 tablet    Take 1 tablet (81 mg) by mouth daily       atorvastatin 40 MG tablet    LIPITOR    90 tablet    Take 1 tablet (40 mg) by mouth daily       calcium 600 MG tablet     180 tablet    Take 2 tablets by mouth daily       clopidogrel 75 MG tablet    PLAVIX    90 tablet    Take 1 tablet (75 mg) by mouth daily       fish oil-omega-3 fatty acids 1000 MG capsule     60    1 daily       losartan-hydrochlorothiazide 50-12.5 MG per tablet    HYZAAR    90 tablet    Take 1 tablet by mouth daily       FRANCOIS 128 2 % ophthalmic solution   Generic drug:  sodium chloride      Place 1 drop into both eyes At Bedtime.       SYSTANE 0.4-0.3 % Soln ophthalmic solution   Generic drug:  polyethylene glycol 0.4%- propylene glycol 0.3%      Place 1 drop into both eyes 3 times daily.       VITAMIN D3 PO      Take 2,000 Units by mouth daily.

## 2017-04-05 ENCOUNTER — HOSPITAL ENCOUNTER (OUTPATIENT)
Dept: CARDIOLOGY | Facility: CLINIC | Age: 77
Discharge: HOME | End: 2017-04-05
Attending: PSYCHIATRY & NEUROLOGY | Admitting: PSYCHIATRY & NEUROLOGY
Payer: MEDICARE

## 2017-04-05 DIAGNOSIS — G45.9 TRANSIENT CEREBRAL ISCHEMIA, UNSPECIFIED TYPE: ICD-10-CM

## 2017-04-05 DIAGNOSIS — I67.2 INTRACRANIAL ATHEROSCLEROSIS: ICD-10-CM

## 2017-04-05 DIAGNOSIS — G45.3 AMAUROSIS FUGAX: ICD-10-CM

## 2017-04-05 DIAGNOSIS — R73.09 ELEVATED GLUCOSE: ICD-10-CM

## 2017-04-05 PROCEDURE — 27210995 ZZH RX 272: Performed by: PSYCHIATRY & NEUROLOGY

## 2017-04-05 PROCEDURE — 93306 TTE W/DOPPLER COMPLETE: CPT | Mod: 26 | Performed by: INTERNAL MEDICINE

## 2017-04-05 PROCEDURE — 40000264 ECHO COMPLETE BUBBLE

## 2017-04-05 RX ORDER — MAGNESIUM HYDROXIDE 1200 MG/15ML
30 LIQUID ORAL ONCE
Status: COMPLETED | OUTPATIENT
Start: 2017-04-05 | End: 2017-04-05

## 2017-04-05 RX ADMIN — HYDROCODONE BITARTRATE AND ACETAMINOPHEN 30 ML: 500; 5 TABLET ORAL at 10:53

## 2017-04-07 ENCOUNTER — OFFICE VISIT (OUTPATIENT)
Dept: FAMILY MEDICINE | Facility: CLINIC | Age: 77
End: 2017-04-07
Payer: COMMERCIAL

## 2017-04-07 VITALS
TEMPERATURE: 97.8 F | HEIGHT: 64 IN | BODY MASS INDEX: 32.1 KG/M2 | WEIGHT: 188 LBS | SYSTOLIC BLOOD PRESSURE: 120 MMHG | DIASTOLIC BLOOD PRESSURE: 82 MMHG | OXYGEN SATURATION: 98 % | HEART RATE: 68 BPM

## 2017-04-07 DIAGNOSIS — Z86.73 HISTORY OF ISCHEMIC VERTEBROBASILAR ARTERY CEREBELLAR STROKE: ICD-10-CM

## 2017-04-07 DIAGNOSIS — Z12.11 COLON CANCER SCREENING: ICD-10-CM

## 2017-04-07 DIAGNOSIS — I10 HTN, GOAL BELOW 140/90: ICD-10-CM

## 2017-04-07 DIAGNOSIS — Z00.00 ROUTINE GENERAL MEDICAL EXAMINATION AT A HEALTH CARE FACILITY: Primary | ICD-10-CM

## 2017-04-07 DIAGNOSIS — E78.5 HYPERLIPIDEMIA LDL GOAL <100: ICD-10-CM

## 2017-04-07 LAB
ANION GAP SERPL CALCULATED.3IONS-SCNC: 8 MMOL/L (ref 3–14)
BUN SERPL-MCNC: 12 MG/DL (ref 7–30)
CALCIUM SERPL-MCNC: 9.6 MG/DL (ref 8.5–10.1)
CHLORIDE SERPL-SCNC: 105 MMOL/L (ref 94–109)
CO2 SERPL-SCNC: 28 MMOL/L (ref 20–32)
CREAT SERPL-MCNC: 0.92 MG/DL (ref 0.52–1.04)
GFR SERPL CREATININE-BSD FRML MDRD: 60 ML/MIN/1.7M2
GLUCOSE SERPL-MCNC: 103 MG/DL (ref 70–99)
POTASSIUM SERPL-SCNC: 3.9 MMOL/L (ref 3.4–5.3)
SODIUM SERPL-SCNC: 141 MMOL/L (ref 133–144)

## 2017-04-07 PROCEDURE — G0328 FECAL BLOOD SCRN IMMUNOASSAY: HCPCS | Performed by: FAMILY MEDICINE

## 2017-04-07 PROCEDURE — 36415 COLL VENOUS BLD VENIPUNCTURE: CPT | Performed by: FAMILY MEDICINE

## 2017-04-07 PROCEDURE — 80048 BASIC METABOLIC PNL TOTAL CA: CPT | Performed by: FAMILY MEDICINE

## 2017-04-07 PROCEDURE — 99397 PER PM REEVAL EST PAT 65+ YR: CPT | Performed by: FAMILY MEDICINE

## 2017-04-07 RX ORDER — CLOPIDOGREL BISULFATE 75 MG/1
75 TABLET ORAL DAILY
Qty: 90 TABLET | Refills: 3 | Status: SHIPPED | OUTPATIENT
Start: 2017-04-07 | End: 2017-11-27

## 2017-04-07 RX ORDER — AMLODIPINE BESYLATE 2.5 MG/1
2.5 TABLET ORAL DAILY
Qty: 90 TABLET | Refills: 3 | Status: SHIPPED | OUTPATIENT
Start: 2017-04-07 | End: 2017-11-27

## 2017-04-07 RX ORDER — CARBOXYMETHYLCELLULOSE SODIUM 5 MG/ML
1 SOLUTION/ DROPS OPHTHALMIC 3 TIMES DAILY PRN
COMMUNITY

## 2017-04-07 RX ORDER — LOSARTAN POTASSIUM AND HYDROCHLOROTHIAZIDE 12.5; 5 MG/1; MG/1
1 TABLET ORAL DAILY
Qty: 90 TABLET | Refills: 3 | Status: SHIPPED | OUTPATIENT
Start: 2017-04-07 | End: 2017-11-27

## 2017-04-07 RX ORDER — ATORVASTATIN CALCIUM 40 MG/1
40 TABLET, FILM COATED ORAL DAILY
Qty: 90 TABLET | Refills: 3 | Status: SHIPPED | OUTPATIENT
Start: 2017-04-07 | End: 2018-04-11

## 2017-04-07 NOTE — LETTER
[facility]          [address] [place] [address]                            [phone]  Fax: [phone]  April 7, 2017     Julieta Holm  [address]   [place] [address]      Dear Julieta,    I have reviewed your recent labs. Here are the results:    Kidney functions have improved as compared to before. Stay well hydrated.  Your fasting blood glucose is slightly elevated. We'll continue to monitor that closely.      Results for orders placed or performed in visit on 04/07/17   Basic metabolic panel   Result Value Ref Range    Sodium 141 133 - 144 mmol/L    Potassium 3.9 3.4 - 5.3 mmol/L    Chloride 105 94 - 109 mmol/L    Carbon Dioxide 28 20 - 32 mmol/L    Anion Gap 8 3 - 14 mmol/L    Glucose 103 (H) 70 - 99 mg/dL    Urea Nitrogen 12 7 - 30 mg/dL    Creatinine 0.92 0.52 - 1.04 mg/dL    GFR Estimate 60 (L) >60 mL/min/1.7m2    GFR Estimate If Black 73 >60 mL/min/1.7m2    Calcium 9.6 8.5 - 10.1 mg/dL   Thank you for choosing [facility].  We appreciate the opportunity to serve you and look forward to supporting your healthcare needs in the future.    If you have any questions or concerns, please call me or my staff at [phone].      Sincerely,      Dagoberto Perdomo M.D.

## 2017-04-07 NOTE — NURSING NOTE
"Chief Complaint   Patient presents with     Medicare Visit     fasting       Initial /82 (BP Location: Right arm, Cuff Size: Adult Regular)  Pulse 68  Temp 97.8  F (36.6  C) (Tympanic)  Ht 5' 4\" (1.626 m)  Wt 188 lb (85.3 kg)  SpO2 98%  BMI 32.27 kg/m2 Estimated body mass index is 32.27 kg/(m^2) as calculated from the following:    Height as of this encounter: 5' 4\" (1.626 m).    Weight as of this encounter: 188 lb (85.3 kg).  Medication Reconciliation: complete  "

## 2017-04-07 NOTE — MR AVS SNAPSHOT
After Visit Summary   4/7/2017    Julieta Holm    MRN: [id number]           Patient Information     Date Of Birth          1940        Visit Information        Provider Department      4/7/2017 8:20 AM Conor Areans MD [facility]        Today's Diagnoses     Routine general medical examination at a health care facility    -  1    Hyperlipidemia LDL goal <100        HTN, goal below 140/90        History of ischemic vertebrobasilar artery cerebellar stroke        Colon cancer screening          Care Instructions      Preventive Health Recommendations    Female Ages 65 +    Yearly exam:     See your health care provider every year in order to  o Review health changes.   o Discuss preventive care.    o Review your medicines if your doctor has prescribed any.      You no longer need a yearly Pap test unless you've had an abnormal Pap test in the past 10 years. If you have vaginal symptoms, such as bleeding or discharge, be sure to talk with your provider about a Pap test.      Every 1 to 2 years, have a mammogram.  If you are over 69, talk with your health care provider about whether or not you want to continue having screening mammograms.      Every 10 years, have a colonoscopy. Or, have a yearly FIT test (stool test). These exams will check for colon cancer.       Have a cholesterol test every 5 years, or more often if your doctor advises it.       Have a diabetes test (fasting glucose) every three years. If you are at risk for diabetes, you should have this test more often.       At age 65, have a bone density scan (DEXA) to check for osteoporosis (brittle bone disease).    Shots:    Get a flu shot each year.    Get a tetanus shot every 10 years.    Talk to your doctor about your pneumonia vaccines. There are now two you should receive - Pneumovax (PPSV 23) and Prevnar (PCV 13).    Talk to your doctor about the shingles vaccine.    Talk to your doctor about the hepatitis B  "vaccine.    Nutrition:     Eat at least 5 servings of fruits and vegetables each day.      Eat whole-grain bread, whole-wheat pasta and brown rice instead of white grains and rice.      Talk to your provider about Calcium and Vitamin D.     Lifestyle    Exercise at least 150 minutes a week (30 minutes a day, 5 days a week). This will help you control your weight and prevent disease.      Limit alcohol to one drink per day.      No smoking.       Wear sunscreen to prevent skin cancer.       See your dentist twice a year for an exam and cleaning.      See your eye doctor every 1 to 2 years to screen for conditions such as glaucoma, macular degeneration and cataracts.        Follow-ups after your visit        Future tests that were ordered for you today     Open Future Orders        Priority Expected Expires Ordered    Fecal colorectal cancer screen (FIT) Routine 4/28/2017 6/30/2017 4/7/2017            Who to contact     If you have questions or need follow up information about today's clinic visit or your schedule please contact [facility] [place] directly at [phone].  Normal or non-critical lab and imaging results will be communicated to you by ProtoSharehart, letter or phone within 4 business days after the clinic has received the results. If you do not hear from us within 7 days, please contact the clinic through [employer] or phone. If you have a critical or abnormal lab result, we will notify you by phone as soon as possible.  Submit refill requests through [employer] or call your pharmacy and they will forward the refill request to us. Please allow 3 business days for your refill to be completed.          Additional Information About Your Visit        [employer] Information     [employer] lets you send messages to your doctor, view your test results, renew your prescriptions, schedule appointments and more. To sign up, go to [email or web address] . Click on \"Log in\" on the left side of the screen, which will " "take you to the Welcome page. Then click on \"Sign up Now\" on the right side of the page.     You will be asked to enter the access code listed below, as well as some personal information. Please follow the directions to create your username and password.     Your access code is: [email or web address]  Expires: 2017  4:21 PM     Your access code will  in 90 days. If you need help or a new code, please call your [place] [facility] clinic or [phone].        Care EveryWhere ID     This is your Care EveryWhere ID. This could be used by other organizations to access your [place] [facility] medical records  [id number]        Your Vitals Were     Pulse Temperature Height Pulse Oximetry BMI (Body Mass Index)       68 97.8  F (36.6  C) (Tympanic) 5' 4\" (1.626 m) 98% 32.27 kg/m2        Blood Pressure from Last 3 Encounters:   17 120/82   17 114/78   17 114/67    Weight from Last 3 Encounters:   17 188 lb (85.3 kg)   17 185 lb (83.9 kg)   16 193 lb (87.5 kg)              We Performed the Following     Basic metabolic panel          Where to get your medicines      These medications were sent to [facility] - [place], [place] - [address]  [address], [facility] [facility] [address]     Phone:  [phone]     amLODIPine 2.5 MG tablet    atorvastatin 40 MG tablet    clopidogrel 75 MG tablet    losartan-hydrochlorothiazide 50-12.5 MG per tablet          Primary Care Provider Office Phone # Fax #    Conor Arenas [phone]       [facility] [place] [address] [place] [address] [address]  [place] [address]        Thank you!     [facility]. Our goal is always to provide you with excellent care. Hearing back from our patients is one way we can continue to improve our services. Please take a few minutes to complete the written survey that you may receive in the mail after your visit with us. Thank you!             Your " Updated Medication List - Protect others around you: Learn how to safely use, store and throw away your medicines at www.disposemymeds.org.          This list is accurate as of: 4/7/17  8:47 AM.  Always use your most recent med list.                   Brand Name Dispense Instructions for use    amLODIPine 2.5 MG tablet    NORVASC    90 tablet    Take 1 tablet (2.5 mg) by mouth daily       aspirin 81 MG tablet     7 tablet    Take 1 tablet (81 mg) by mouth daily       atorvastatin 40 MG tablet    LIPITOR    90 tablet    Take 1 tablet (40 mg) by mouth daily       calcium 600 MG tablet     180 tablet    Take 2 tablets by mouth daily       carboxymethylcellulose sodium 0.5 % Soln ophthalmic solution   Generic drug:  carboxymethylcellulose      Place 1 drop into both eyes 3 times daily as needed for dry eyes       clopidogrel 75 MG tablet    PLAVIX    90 tablet    Take 1 tablet (75 mg) by mouth daily       fish oil-omega-3 fatty acids 1000 MG capsule     60    1 daily       losartan-hydrochlorothiazide 50-12.5 MG per tablet    HYZAAR    90 tablet    Take 1 tablet by mouth daily       FRANCOIS 128 2 % ophthalmic solution   Generic drug:  sodium chloride      Place 1 drop into both eyes At Bedtime.       VITAMIN D3 PO      Take 2,000 Units by mouth daily.

## 2017-04-07 NOTE — PROGRESS NOTES
SUBJECTIVE:                                                            Julieta Holm is a 76 year old female who presents for Preventive Visit.    Are you in the first 12 months of your Medicare Part B coverage?  No    Healthy Habits:    Do you get at least three servings of calcium containing foods daily (dairy, green leafy vegetables, etc.)? yes    Amount of exercise or daily activities, outside of work: 4-5 day(s) per week    Problems taking medications regularly No    Medication side effects: No    Have you had an eye exam in the past two years? yes    Do you see a dentist twice per year? no    Do you have sleep apnea, excessive snoring or daytime drowsiness?no    COGNITIVE SCREEN  1) Repeat 3 items (Banana, Sunrise, Chair)    2) Clock draw: NORMAL  3) 3 item recall: Recalls 3 objects  Results: 3 items recalled: COGNITIVE IMPAIRMENT LESS LIKELY    Mini-CogTM Copyright S Ricardo. Licensed by the author for use in [employer]; reprinted with permission (desiree@[facility]). All rights reserved.        Gets light headed from taking the aspirin           Hyperlipidemia Follow-Up      Rate your low fat/cholesterol diet?: good    Taking statin?  Yes, no muscle aches from statin    Other lipid medications/supplements?:  none     Hypertension Follow-up      Outpatient blood pressures are not being checked.    Low Salt Diet: no added salt     Cerebrovascular Follow-up      Patient history: ischemic cerebrovascular incident    Residual symptoms: None    Worsened or new symptoms since last visit: No    Daily aspirin use: she feels dizzy after taking ASA in the morning.     Hypertension controlled: Yes         Reviewed and updated as needed this visit by clinical staff  Tobacco  Allergies  Meds  Soc Hx        Reviewed and updated as needed this visit by Provider        Social History   Substance Use Topics     Smoking status: Never Smoker     Smokeless tobacco: Never Used     Alcohol use Yes      Comment: few  per year       The patient does not drink >3 drinks per day nor >7 drinks per week.    Today's PHQ-2 Score:   PHQ-2 ( 1999 Pfizer) 4/7/2017 3/29/2017   Q1: Little interest or pleasure in doing things 0 0   Q2: Feeling down, depressed or hopeless 0 0   PHQ-2 Score 0 0       Do you feel safe in your environment - YES    Do you have a Health Care Directive?: Yes: Advance Directive has been received and scanned.    Current providers sharing in care for this patient include:   Patient Care Team:  Conor Arenas MD as PCP - General (Family Practice)      Hearing impairment: Yes, - mild on the left. Had a hearing test done 2 years ago.     Ability to successfully perform activities of daily living: Yes, no assistance needed     Fall risk:  Fallen 2 or more times in the past year?: No  Any fall with injury in the past year?: No    Home safety:  none identified      The following health maintenance items are reviewed in Epic and correct as of today:  Health Maintenance   Topic Date Due     PNEUMOCOCCAL (1 of 2 - PCV13) 11/23/2007     MICROALBUMIN Q1 YEAR( NO INBASKET)  04/16/2015     MEDICARE ANNUAL WELLNESS VISIT  04/06/2017     FIT Q1 YR (NO INBASKET)  04/06/2017     DEXA Q2 YR  04/29/2017     ADVANCE DIRECTIVE PLANNING Q5 YRS (NO INBASKET)  06/07/2017     INFLUENZA VACCINE (SYSTEM ASSIGNED)  09/01/2017     BMP Q1 YR (NO INBASKET)  03/28/2018     FALL RISK ASSESSMENT  03/29/2018     MAMMO SCREEN Q2 YR (SYSTEM ASSIGNED)  12/08/2018     LIPID SCREEN Q5 YR FEMALE (SYSTEM ASSIGNED)  03/31/2022     TETANUS IMMUNIZATION (SYSTEM ASSIGNED)  04/06/2026       ROS:  C: NEGATIVE for fever, chills, change in weight  I: NEGATIVE for worrisome rashes, moles or lesions  E: NEGATIVE for vision changes or irritation  E/M: NEGATIVE for ear, mouth and throat problems  R: NEGATIVE for significant cough or SOB  B: NEGATIVE for masses, tenderness or discharge  CV: NEGATIVE for chest pain, palpitations or peripheral edema  GI: NEGATIVE for nausea,  abdominal pain, heartburn, or change in bowel habits  : NEGATIVE for frequency, dysuria, or hematuria  M: NEGATIVE for significant arthralgias or myalgia  N: NEGATIVE for weakness, dizziness or paresthesias  E: NEGATIVE for temperature intolerance, skin/hair changes  H: NEGATIVE for bleeding problems  P: NEGATIVE for changes in mood or affect    Patient Active Problem List   Diagnosis     Advanced directives, counseling/discussion     Osteopenia     Vertebral artery stenosis     Hyperlipidemia LDL goal <100     History of ischemic vertebrobasilar artery cerebellar stroke     Chronic constipation     Obesity (BMI 30.0-34.9)     HTN, goal below 140/90     Rectocele     Past Surgical History:   Procedure Laterality Date     C VAGINAL HYSTERECTOMY      menometrorrhagia.  Had a laparoscpy prior to this procedure     CATARACT IOL, RT/LT  2008    bilateral     COLONOSCOPY  2001    patient told to repeat in 10 years     HC DILATION/CURETTAGE DIAG/THER NON OB      D&C's     HC LAPAROSCOPY, SURGICAL; CHOLECYSTECTOMY      Cholecystectomy, Laparoscopic     HYSTERECTOMY, PAP NO LONGER INDICATED       TONSILLECTOMY  194       Social History   Substance Use Topics     Smoking status: Never Smoker     Smokeless tobacco: Never Used     Alcohol use Yes      Comment: few per year     Family History   Problem Relation Age of Onset     C.A.D. Father       age 64 sudden death     DIABETES Mother      type 2     Cardiovascular Mother      peripheral vascular disease     CEREBROVASCULAR DISEASE Mother      onset age 80s     Hypertension Mother      Obesity Mother      HEART DISEASE Other      grandson with tetrology of Fallot     Lipids Son          Current Outpatient Prescriptions   Medication Sig Dispense Refill     carboxymethylcellulose (CARBOXYMETHYLCELLULOSE SODIUM) 0.5 % SOLN ophthalmic solution Place 1 drop into both eyes 3 times daily as needed for dry eyes       atorvastatin (LIPITOR) 40 MG tablet Take 1  "tablet (40 mg) by mouth daily 90 tablet 3     losartan-hydrochlorothiazide (HYZAAR) 50-12.5 MG per tablet Take 1 tablet by mouth daily 90 tablet 3     amLODIPine (NORVASC) 2.5 MG tablet Take 1 tablet (2.5 mg) by mouth daily 90 tablet 3     clopidogrel (PLAVIX) 75 MG tablet Take 1 tablet (75 mg) by mouth daily 90 tablet 3     aspirin 81 MG tablet Take 1 tablet (81 mg) by mouth daily 7 tablet 0     calcium 600 MG tablet Take 2 tablets by mouth daily 180 tablet 3     Cholecalciferol (VITAMIN D3 PO) Take 2,000 Units by mouth daily.       sodium chloride (FRANCOIS 128) 2 % ophthalmic solution Place 1 drop into both eyes At Bedtime.       FISH OIL 1000 MG OR CAPS 1 daily 60 11     [DISCONTINUED] clopidogrel (PLAVIX) 75 MG tablet Take 1 tablet (75 mg) by mouth daily 90 tablet 3     [DISCONTINUED] amLODIPine (NORVASC) 2.5 MG tablet Take 1 tablet (2.5 mg) by mouth daily 90 tablet 3     [DISCONTINUED] losartan-hydrochlorothiazide (HYZAAR) 50-12.5 MG per tablet Take 1 tablet by mouth daily 90 tablet 3     [DISCONTINUED] atorvastatin (LIPITOR) 40 MG tablet Take 1 tablet (40 mg) by mouth daily 90 tablet 3     Allergies   Allergen Reactions     Lisinopril      Cough       Nabumetone GI Disturbance     OBJECTIVE:                                                            /82 (BP Location: Right arm, Cuff Size: Adult Regular)  Pulse 68  Temp 97.8  F (36.6  C) (Tympanic)  Ht 5' 4\" (1.626 m)  Wt 188 lb (85.3 kg)  SpO2 98%  BMI 32.27 kg/m2 Estimated body mass index is 32.27 kg/(m^2) as calculated from the following:    Height as of this encounter: 5' 4\" (1.626 m).    Weight as of this encounter: 188 lb (85.3 kg).  EXAM:   GENERAL APPEARANCE: healthy, alert and no distress  EYES: Eyes grossly normal to inspection, PERRL and conjunctivae and sclerae normal  HENT: ear canals and TM's normal, nose and mouth without ulcers or lesions, oropharynx clear and oral mucous membranes moist  NECK: no adenopathy, no asymmetry, masses, or " scars and thyroid normal to palpation  RESP: lungs clear to auscultation - no rales, rhonchi or wheezes  BREAST: normal without masses, tenderness or nipple discharge and no palpable axillary masses or adenopathy  CV: regular rate and rhythm, normal S1 S2, no S3 or S4, no murmur, click or rub, no peripheral edema and peripheral pulses strong  ABDOMEN: soft, nontender, no hepatosplenomegaly, no masses and bowel sounds normal  MS: no musculoskeletal defects are noted and gait is age appropriate without ataxia  SKIN: no suspicious lesions or rashes  NEURO: Normal strength and tone, sensory exam grossly normal, mentation intact and speech normal  PSYCH: mentation appears normal and affect normal/bright    ASSESSMENT / PLAN:                                                            1. Routine general medical examination at a health care facility  Annual wellness visits recommended  Patient declined pneumonia and shingles vaccines. Hand out given to review.     2. Hyperlipidemia LDL goal <100  Well controlled.   - atorvastatin (LIPITOR) 40 MG tablet; Take 1 tablet (40 mg) by mouth daily  Dispense: 90 tablet; Refill: 3    3. HTN, goal below 140/90  Well controlled  - losartan-hydrochlorothiazide (HYZAAR) 50-12.5 MG per tablet; Take 1 tablet by mouth daily  Dispense: 90 tablet; Refill: 3  - amLODIPine (NORVASC) 2.5 MG tablet; Take 1 tablet (2.5 mg) by mouth daily  Dispense: 90 tablet; Refill: 3  - Basic metabolic panel- ordered and the last renal function was not normal.     4. History of ischemic vertebrobasilar artery cerebellar stroke  Resume plavix. Patient is recommended to use ASA at night as she feels dizzy during the day.   - clopidogrel (PLAVIX) 75 MG tablet; Take 1 tablet (75 mg) by mouth daily  Dispense: 90 tablet; Refill: 3    5. Colon cancer screening    - Fecal colorectal cancer screen (FIT); Future    End of Life Planning:  Patient currently has an advanced directive: Yes.  Practitioner is supportive of  "decision.    COUNSELING:  Reviewed preventive health counseling, as reflected in patient instructions       Regular exercise       Healthy diet/nutrition        Estimated body mass index is 32.27 kg/(m^2) as calculated from the following:    Height as of this encounter: 5' 4\" (1.626 m).    Weight as of this encounter: 188 lb (85.3 kg).  Weight management plan: Discussed healthy diet and exercise guidelines and patient will follow up in 12 months in clinic to re-evaluate.   reports that she has never smoked. She has never used smokeless tobacco.      Appropriate preventive services were discussed with this patient, including applicable screening as appropriate for cardiovascular disease, diabetes, osteopenia/osteoporosis, and glaucoma.  As appropriate for age/gender, discussed screening for colorectal cancer, prostate cancer, breast cancer, and cervical cancer. Checklist reviewing preventive services available has been given to the patient.    Reviewed patients plan of care and provided an AVS. The Intermediate Care Plan ( asthma action plan, low back pain action plan, and migraine action plan) for Julieta meets the Care Plan requirement. This Care Plan has been established and reviewed with the Patient.    Counseling Resources:  ATP IV Guidelines  Pooled Cohorts Equation Calculator  Breast Cancer Risk Calculator  FRAX Risk Assessment  ICSI Preventive Guidelines  Dietary Guidelines for Americans, 2010  USDA's MyPlate  ASA Prophylaxis  Lung CA Screening    Conor Arenas MD  [facility] [place] [facility] [place]  "

## 2017-04-11 DIAGNOSIS — Z12.11 COLON CANCER SCREENING: ICD-10-CM

## 2017-04-11 LAB — HEMOCCULT STL QL IA: NEGATIVE

## 2017-05-03 ENCOUNTER — OFFICE VISIT (OUTPATIENT)
Dept: FAMILY MEDICINE | Facility: CLINIC | Age: 77
End: 2017-05-03
Payer: COMMERCIAL

## 2017-05-03 VITALS
TEMPERATURE: 98.1 F | WEIGHT: 188.2 LBS | DIASTOLIC BLOOD PRESSURE: 70 MMHG | HEART RATE: 74 BPM | SYSTOLIC BLOOD PRESSURE: 112 MMHG | HEIGHT: 64 IN | BODY MASS INDEX: 32.13 KG/M2 | OXYGEN SATURATION: 96 %

## 2017-05-03 DIAGNOSIS — G45.3 AMAUROSIS FUGAX: Primary | ICD-10-CM

## 2017-05-03 PROCEDURE — 99214 OFFICE O/P EST MOD 30 MIN: CPT | Performed by: FAMILY MEDICINE

## 2017-05-03 NOTE — NURSING NOTE
"Chief Complaint   Patient presents with     Results     follow-up Echo & Holter results       Initial /70 (BP Location: Right arm, Cuff Size: Adult Regular)  Pulse 74  Temp 98.1  F (36.7  C) (Tympanic)  Ht 5' 4\" (1.626 m)  Wt 188 lb 3.2 oz (85.4 kg)  SpO2 96%  BMI 32.3 kg/m2 Estimated body mass index is 32.3 kg/(m^2) as calculated from the following:    Height as of this encounter: 5' 4\" (1.626 m).    Weight as of this encounter: 188 lb 3.2 oz (85.4 kg).  Medication Reconciliation: complete  "

## 2017-05-03 NOTE — MR AVS SNAPSHOT
"              After Visit Summary   5/3/2017    Julieta Holm    MRN: [id number]           Patient Information     Date Of Birth          1940        Visit Information        Provider Department      5/3/2017 8:40 AM Conor Arenas MD [facility] [place]        Today's Diagnoses     Amaurosis fugax    -  1       Follow-ups after your visit        Who to contact     If you have questions or need follow up information about today's clinic visit or your schedule please contact [facility] [place] directly at [phone].  Normal or non-critical lab and imaging results will be communicated to you by [employer], letter or phone within 4 business days after the clinic has received the results. If you do not hear from us within 7 days, please contact the clinic through [employer] or phone. If you have a critical or abnormal lab result, we will notify you by phone as soon as possible.  Submit refill requests through [employer] or call your pharmacy and they will forward the refill request to us. Please allow 3 business days for your refill to be completed.          Additional Information About Your Visit        MyChart Information     [employer] lets you send messages to your doctor, view your test results, renew your prescriptions, schedule appointments and more. To sign up, go to [email or web address] . Click on \"Log in\" on the left side of the screen, which will take you to the Welcome page. Then click on \"Sign up Now\" on the right side of the page.     You will be asked to enter the access code listed below, as well as some personal information. Please follow the directions to create your username and password.     Your access code is: [email or web address]  Expires: 2017  4:21 PM     Your access code will  in 90 days. If you need help or a new code, please call your [facility] or [phone].        Care EveryWhere ID     This is your Care EveryWhere ID. This could be used by other " "organizations to access your [place] medical records  [id number]        Your Vitals Were     Pulse Temperature Height Pulse Oximetry BMI (Body Mass Index)       74 98.1  F (36.7  C) (Tympanic) 5' 4\" (1.626 m) 96% 32.3 kg/m2        Blood Pressure from Last 3 Encounters:   05/03/17 112/70   04/07/17 120/82   03/29/17 114/78    Weight from Last 3 Encounters:   05/03/17 188 lb 3.2 oz (85.4 kg)   04/07/17 188 lb (85.3 kg)   03/29/17 185 lb (83.9 kg)              Today, you had the following     No orders found for display       Primary Care Provider Office Phone # Fax #    Conor Arenas [phone]       [facility]  [place] [address]        Thank you!     Thank you for choosing [facility] [place]  for your care. Our goal is always to provide you with excellent care. Hearing back from our patients is one way we can continue to improve our services. Please take a few minutes to complete the written survey that you may receive in the mail after your visit with us. Thank you!             Your Updated Medication List - Protect others around you: Learn how to safely use, store and throw away your medicines at www.disposemymeds.org.          This list is accurate as of: 5/3/17 11:59 PM.  Always use your most recent med list.                   Brand Name Dispense Instructions for use    amLODIPine 2.5 MG tablet    NORVASC    90 tablet    Take 1 tablet (2.5 mg) by mouth daily       aspirin 81 MG tablet     7 tablet    Take 1 tablet (81 mg) by mouth daily       atorvastatin 40 MG tablet    LIPITOR    90 tablet    Take 1 tablet (40 mg) by mouth daily       calcium 600 MG tablet     180 tablet    Take 2 tablets by mouth daily       carboxymethylcellulose sodium 0.5 % Soln ophthalmic solution   Generic drug:  carboxymethylcellulose      Place 1 drop into both eyes 3 times daily as needed for dry eyes       clopidogrel 75 MG tablet    PLAVIX    90 tablet    " Take 1 tablet (75 mg) by mouth daily       fish oil-omega-3 fatty acids 1000 MG capsule     60    1 daily       losartan-hydrochlorothiazide 50-12.5 MG per tablet    HYZAAR    90 tablet    Take 1 tablet by mouth daily       FRANCOIS 128 2 % ophthalmic solution   Generic drug:  sodium chloride      Place 1 drop into both eyes At Bedtime.       VITAMIN D3 PO      Take 2,000 Units by mouth daily.

## 2017-05-03 NOTE — PROGRESS NOTES
SUBJECTIVE:                                                    Julieta Holm is a 76 year old female who presents to clinic today for the following health issues:      Concern - follow-up event monitor results      Patient had recent episode of visual loss for which she was seen at the emergency room. It was considered to be a TIA event. As a part of the workup she had an event monitor placed. She comes in today to review the results.     Denies any recurrent episodes. Denies any other neurological symptoms   And denies any vision changes     Event monitor shows sinus rhythm with wide QRS tachycardia for 6 beats only, which was asymptomatic. No other symptoms were reported at all for the entire duration of event monitoring.    Problem list and histories reviewed & adjusted, as indicated.  Additional history: as documented    Patient Active Problem List   Diagnosis     Advanced directives, counseling/discussion     Osteopenia     Vertebral artery stenosis     Hyperlipidemia LDL goal <100     History of ischemic vertebrobasilar artery cerebellar stroke     Chronic constipation     Obesity (BMI 30.0-34.9)     HTN, goal below 140/90     Rectocele     Past Surgical History:   Procedure Laterality Date     C VAGINAL HYSTERECTOMY      menometrorrhagia.  Had a laparoscpy prior to this procedure     CATARACT IOL, RT/LT  2008    bilateral     COLONOSCOPY  2001    patient told to repeat in 10 years     HC DILATION/CURETTAGE DIAG/THER NON OB      D&C's     HC LAPAROSCOPY, SURGICAL; CHOLECYSTECTOMY      Cholecystectomy, Laparoscopic     HYSTERECTOMY, PAP NO LONGER INDICATED       TONSILLECTOMY  194       Social History   Substance Use Topics     Smoking status: Never Smoker     Smokeless tobacco: Never Used     Alcohol use Yes      Comment: few per year     Family History   Problem Relation Age of Onset     C.A.D. Father       age 64 sudden death     DIABETES Mother      type 2     Cardiovascular Mother  "     peripheral vascular disease     CEREBROVASCULAR DISEASE Mother      onset age 80s     Hypertension Mother      Obesity Mother      HEART DISEASE Other      grandson with tetrology of Fallot     Lipids Son          Current Outpatient Prescriptions   Medication Sig Dispense Refill     carboxymethylcellulose (CARBOXYMETHYLCELLULOSE SODIUM) 0.5 % SOLN ophthalmic solution Place 1 drop into both eyes 3 times daily as needed for dry eyes       atorvastatin (LIPITOR) 40 MG tablet Take 1 tablet (40 mg) by mouth daily 90 tablet 3     losartan-hydrochlorothiazide (HYZAAR) 50-12.5 MG per tablet Take 1 tablet by mouth daily 90 tablet 3     amLODIPine (NORVASC) 2.5 MG tablet Take 1 tablet (2.5 mg) by mouth daily 90 tablet 3     clopidogrel (PLAVIX) 75 MG tablet Take 1 tablet (75 mg) by mouth daily 90 tablet 3     aspirin 81 MG tablet Take 1 tablet (81 mg) by mouth daily 7 tablet 0     calcium 600 MG tablet Take 2 tablets by mouth daily 180 tablet 3     Cholecalciferol (VITAMIN D3 PO) Take 2,000 Units by mouth daily.       sodium chloride (FRANCOIS 128) 2 % ophthalmic solution Place 1 drop into both eyes At Bedtime.       FISH OIL 1000 MG OR CAPS 1 daily 60 11     Allergies   Allergen Reactions     Lisinopril      Cough       Nabumetone GI Disturbance       Reviewed and updated as needed this visit by clinical staff       Reviewed and updated as needed this visit by Provider         ROS:  C: NEGATIVE for fever, chills, change in weight  E/M: NEGATIVE for ear, mouth and throat problems  R: NEGATIVE for significant cough or SOB  CV: NEGATIVE for chest pain, palpitations or peripheral edema    OBJECTIVE:                                                    /70 (BP Location: Right arm, Cuff Size: Adult Regular)  Pulse 74  Temp 98.1  F (36.7  C) (Tympanic)  Ht 5' 4\" (1.626 m)  Wt 188 lb 3.2 oz (85.4 kg)  SpO2 96%  BMI 32.3 kg/m2  Body mass index is 32.3 kg/(m^2).   GENERAL: healthy, alert, well nourished, well hydrated, no " distress  EYES: Eyes grossly normal to inspection, extraocular movements - intact, and PERRL  HENT: ear canals- normal; TMs- normal; Nose- normal; Mouth- no ulcers, no lesions  NECK: no tenderness, no adenopathy, no asymmetry, no masses, no stiffness; thyroid- normal to palpation  RESP: lungs clear to auscultation - no rales, no rhonchi, no wheezes  CV: regular rates and rhythm, normal S1 S2, no S3 or S4 and no murmur, no click or rub -  ABDOMEN: soft, no tenderness, no  hepatosplenomegaly, no masses, normal bowel sounds         ASSESSMENT/PLAN:                                                        ICD-10-CM    1. Amaurosis fugax G45.3      Event monitor results reviewed and are essentially normal.   Other cardiac work up reviewed.   No other preventable etiology identified.  Resume plavix and ASA. If any reoccurrence of symptoms occur, instructed to go to ER immediately.     Follow up with Provider - as needed  Total time spent was 25 minutes, more than half the time was spent in counseling the patient and her  about the disease pathogenesis, treatment plan and coordinating care.       Conor Arenas MD  [facility] [place]

## 2017-05-16 ENCOUNTER — TRANSFERRED RECORDS (OUTPATIENT)
Dept: HEALTH INFORMATION MANAGEMENT | Facility: CLINIC | Age: 77
End: 2017-05-16

## 2017-11-27 DIAGNOSIS — I10 HTN, GOAL BELOW 140/90: ICD-10-CM

## 2017-11-27 DIAGNOSIS — Z86.73 HISTORY OF ISCHEMIC VERTEBROBASILAR ARTERY CEREBELLAR STROKE: ICD-10-CM

## 2017-11-29 RX ORDER — AMLODIPINE BESYLATE 2.5 MG/1
TABLET ORAL
Qty: 90 TABLET | Refills: 3 | Status: SHIPPED | OUTPATIENT
Start: 2017-11-29 | End: 2018-04-11

## 2017-11-29 RX ORDER — LOSARTAN POTASSIUM AND HYDROCHLOROTHIAZIDE 12.5; 5 MG/1; MG/1
TABLET ORAL
Qty: 90 TABLET | Refills: 3 | Status: SHIPPED | OUTPATIENT
Start: 2017-11-29 | End: 2018-04-11

## 2017-11-29 RX ORDER — CLOPIDOGREL BISULFATE 75 MG/1
TABLET ORAL
Qty: 90 TABLET | Refills: 3 | Status: SHIPPED | OUTPATIENT
Start: 2017-11-29 | End: 2018-04-11

## 2017-11-29 NOTE — TELEPHONE ENCOUNTER
Routing refill request to provider for review/approval because:  Drug interaction warning    Miesha Arreguin RN  [facility]  [phone]

## 2017-12-18 ENCOUNTER — HOSPITAL ENCOUNTER (OUTPATIENT)
Dept: MAMMOGRAPHY | Facility: CLINIC | Age: 77
Discharge: HOME | End: 2017-12-18
Attending: FAMILY MEDICINE | Admitting: FAMILY MEDICINE
Payer: MEDICARE

## 2017-12-18 DIAGNOSIS — Z12.31 VISIT FOR SCREENING MAMMOGRAM: ICD-10-CM

## 2017-12-18 PROCEDURE — G0202 SCR MAMMO BI INCL CAD: HCPCS

## 2017-12-18 PROCEDURE — 77063 BREAST TOMOSYNTHESIS BI: CPT

## 2018-02-10 ENCOUNTER — NURSE TRIAGE (OUTPATIENT)
Dept: NURSING | Facility: CLINIC | Age: 78
End: 2018-02-10

## 2018-04-11 ENCOUNTER — OFFICE VISIT (OUTPATIENT)
Dept: FAMILY MEDICINE | Facility: CLINIC | Age: 78
End: 2018-04-11
Payer: COMMERCIAL

## 2018-04-11 VITALS — SYSTOLIC BLOOD PRESSURE: 127 MMHG | OXYGEN SATURATION: 95 % | DIASTOLIC BLOOD PRESSURE: 77 MMHG | HEART RATE: 74 BPM

## 2018-04-11 VITALS
HEIGHT: 64 IN | SYSTOLIC BLOOD PRESSURE: 127 MMHG | DIASTOLIC BLOOD PRESSURE: 77 MMHG | OXYGEN SATURATION: 95 % | BODY MASS INDEX: 32.58 KG/M2 | HEART RATE: 74 BPM | WEIGHT: 190.8 LBS

## 2018-04-11 DIAGNOSIS — L81.4 SOLAR LENTIGO: ICD-10-CM

## 2018-04-11 DIAGNOSIS — Z12.11 SCREEN FOR COLON CANCER: ICD-10-CM

## 2018-04-11 DIAGNOSIS — Z86.73 HISTORY OF ISCHEMIC VERTEBROBASILAR ARTERY CEREBELLAR STROKE: ICD-10-CM

## 2018-04-11 DIAGNOSIS — E78.5 HYPERLIPIDEMIA LDL GOAL <100: ICD-10-CM

## 2018-04-11 DIAGNOSIS — L82.1 STUCCO KERATOSES: ICD-10-CM

## 2018-04-11 DIAGNOSIS — L82.1 SEBORRHEIC KERATOSES: ICD-10-CM

## 2018-04-11 DIAGNOSIS — I10 HTN, GOAL BELOW 140/90: ICD-10-CM

## 2018-04-11 DIAGNOSIS — Z00.00 ROUTINE GENERAL MEDICAL EXAMINATION AT A HEALTH CARE FACILITY: Primary | ICD-10-CM

## 2018-04-11 DIAGNOSIS — L98.9 FACIAL LESION: ICD-10-CM

## 2018-04-11 DIAGNOSIS — L57.0 AK (ACTINIC KERATOSIS): Primary | ICD-10-CM

## 2018-04-11 LAB
ERYTHROCYTE [DISTWIDTH] IN BLOOD BY AUTOMATED COUNT: 13.9 % (ref 10–15)
HCT VFR BLD AUTO: 43.9 % (ref 35–47)
HGB BLD-MCNC: 14.6 G/DL (ref 11.7–15.7)
MCH RBC QN AUTO: 31.5 PG (ref 26.5–33)
MCHC RBC AUTO-ENTMCNC: 33.3 G/DL (ref 31.5–36.5)
MCV RBC AUTO: 95 FL (ref 78–100)
PLATELET # BLD AUTO: 252 10E9/L (ref 150–450)
RBC # BLD AUTO: 4.64 10E12/L (ref 3.8–5.2)
WBC # BLD AUTO: 5.5 10E9/L (ref 4–11)

## 2018-04-11 PROCEDURE — 36415 COLL VENOUS BLD VENIPUNCTURE: CPT | Performed by: FAMILY MEDICINE

## 2018-04-11 PROCEDURE — 17003 DESTRUCT PREMALG LES 2-14: CPT | Performed by: FAMILY MEDICINE

## 2018-04-11 PROCEDURE — 80053 COMPREHEN METABOLIC PANEL: CPT | Performed by: FAMILY MEDICINE

## 2018-04-11 PROCEDURE — 99213 OFFICE O/P EST LOW 20 MIN: CPT | Mod: 25 | Performed by: FAMILY MEDICINE

## 2018-04-11 PROCEDURE — 17000 DESTRUCT PREMALG LESION: CPT | Performed by: FAMILY MEDICINE

## 2018-04-11 PROCEDURE — 80061 LIPID PANEL: CPT | Performed by: FAMILY MEDICINE

## 2018-04-11 PROCEDURE — 99397 PER PM REEVAL EST PAT 65+ YR: CPT | Performed by: FAMILY MEDICINE

## 2018-04-11 PROCEDURE — 85027 COMPLETE CBC AUTOMATED: CPT | Performed by: FAMILY MEDICINE

## 2018-04-11 RX ORDER — AMLODIPINE BESYLATE 2.5 MG/1
TABLET ORAL
Qty: 90 TABLET | Refills: 3 | Status: SHIPPED | OUTPATIENT
Start: 2018-04-11

## 2018-04-11 RX ORDER — LOSARTAN POTASSIUM AND HYDROCHLOROTHIAZIDE 12.5; 5 MG/1; MG/1
TABLET ORAL
Qty: 90 TABLET | Refills: 3 | Status: SHIPPED | OUTPATIENT
Start: 2018-04-11

## 2018-04-11 RX ORDER — CLOPIDOGREL BISULFATE 75 MG/1
TABLET ORAL
Qty: 90 TABLET | Refills: 3 | Status: SHIPPED | OUTPATIENT
Start: 2018-04-11

## 2018-04-11 RX ORDER — ATORVASTATIN CALCIUM 20 MG/1
20 TABLET, FILM COATED ORAL DAILY
Qty: 90 TABLET | Refills: 3 | Status: SHIPPED | OUTPATIENT
Start: 2018-04-11 | End: 2018-04-13

## 2018-04-11 NOTE — PATIENT INSTRUCTIONS
FUTURE APPOINTMENTS  Follow up in 1 year(s) for a full-body skin cancer screening.    ACTINIC KERATOSES POST-TREATMENT CARE INSTRUCTIONS  Actinic keratoses are benign, scaly or gritty lesions that appear in sun-exposed areas and may progress to skin cancers. For this reason, it is important to treat them before they become cancerous. Liquid nitrogen is the most commonly used and most effective treatment for actinic keratoses; it is mildly uncomfortable when applied to the skin, but the discomfort rapidly subsides.    Post-Treatment:  You may experience burning and/or stinging immediately following the procedure. The discomfort from the procedure may persist over the next 12-24 hours. The area treated will look pinker and slightly swollen before the healing process begins. You may also notice redness, swelling, tenderness, weeping and crusts or scabs. Healing time is approximately 10-14 days.    Blister - You may or may notice blistering from the freezing. If you develop an uncomfortable blister from today's treatment, you may gently puncture this with a needle that has been cleaned with alcohol. However, do not remove the protective skin layer of the blister.    Scab - After a few days, you may notice scaliness or scab formation. Do not pick at the scabs because this may cause slower healing and a permanent scar.    The skin may appear temporarily darker at the treatment site, but this usually fades over a period of months, provided that the area is protected from the sun.    Care of the areas treated:    Wash the area with a mild cleanser.    Gently pat dry.    Do not rub.     Keep protected from the sun during the healing process and for a full year following treatment as the skin continues to remodel during this time.    Apply Vaseline or Aquaphor ointment sparingly to the site for the first 7 days after treatment.    Do not use Neosporin, as many people eventually develop a medication allergy, that can easily be  "confused with an infection, to Neomycin.    Return if:  There should not be any residual scaling. If there is any concern that the lesion has persisted after 4-6 weeks, make an appointment for a re-check. Healing time does vary depending on your individual healing process and the area of the body treated. Most patients will be healed in one month.    Signs of Infection:  Thankfully this is rare. However if you notice persistent colored drainage, increasing pain, fever or other signs of infection, please call us at: [phone]    SUN PROTECTION INSTRUCTIONS  Sun damage can lead to skin cancer and premature aging of the skin.      The best way to protect from sun damage to your skin is to avoid the sun during peak hours (10 am - 2 pm) even on overcast days.      Use UPF sun-protective clothing, which while more expensive initially provides longer lasting coverage without having to worry about remembering to re-apply.  1. Wear a wide-brimmed hat and sunglasses.   2. Wear sun-protective clothing.  [employer] and other [employer] make sun protective clothing that are stylish, comfortable and cool. [employer] and other [employer] make UV arm sleeves suitable for golfing, gardening and other activities.      Sunscreen instructions:  1. Use sunscreens with Zinc Oxide, Titanium Dioxide or Avobenzone to protect from UVA rays.  2. Use SPF 30-50+ to protect from UVB rays.  3. Re-apply every 2 hours even if water resistant.  4. Apply on your face every day even when cloudy and even in the winter. UVA \"aging rays\" penetrate window glass and are just as strong in the winter as in the summer.    Product Recommendations:    Good examples include: Blue Yasir, EltaMD, Solbar    Good daily moisturizers with SPF: Vanicream, CeraVe.    For sensitive skin, consider : Skin[employer] Essential Defense Mineral Shield Broad Spectrum SPF 35      Never use tanning beds. Tanning beds are associated with much higher risks of skin " "cancer.    All tanning damages the skin. Aim for ivory skin year round and you will have less trouble with your skin in years to come. There is no merit in getting \"a base tan\" before a warm weather vacation, as any tanning indicates your body's response to sun damage.    Stop smoking. Smokers have higher rates of skin cancer and also have premature skin wrinkling.    FYI  You should use about 3 tablespoons of sunscreen to protect your whole body. Thus a typical eight ounce bottle of sunscreen should last 4 applications. Remember, that the SPF rating is compromised if you don t apply enough. Most people only apply 1/2 - 1/3 of the amount they need. Also don t forget areas such as your ears, feet, upper back and harder to reach places. Keep in mind that these amounts should be increased for larger body sizes.    Sunscreens with titanium dioxide and/or zinc oxide in the active ingredients are physical blockers as opposed to chemical blockers. Chemical-free sunscreens should not irritate the skin.    Spray-on sunscreens may be used for touch-up application only, not as a base layer. Also, use with caution around small children due to inhalation risk.    Avoid retinyl palmitate products.    Avoid combination products that include both sunscreen and insect repellant, as sunscreen should be applied every 2 hours, but insect repellant should not be applied as frequently.    SPF means sun protection factor, which is just the degree to which the sunscreen can protect against UVB rays. There is no rating system for UVA rays. SPF is calculated as the time skin will burn when sunscreen is applied vs. skin without sunscreen.    Water resistant sunscreens should be re-applied every 1-2 hours.    For more information:  http://www.skincancer.org/prevention/sun-protection/sunscreen/sunscreens-safe-and-effective  "

## 2018-04-11 NOTE — PROGRESS NOTES
SUBJECTIVE:   Julieta Holm is a 77 year old female who presents for Preventive Visit.  Are you in the first 12 months of your Medicare Part B coverage?  No    Healthy Habits:    Do you get at least three servings of calcium containing foods daily (dairy, green leafy vegetables, etc.)? yes    Amount of exercise or daily activities, outside of work: 1 hour(s) per day    Problems taking medications regularly No    Medication side effects: No    Have you had an eye exam in the past two years? yes    Do you see a dentist twice per year? no    Do you have sleep apnea, excessive snoring or daytime drowsiness?no      Ability to successfully perform activities of daily living: Yes, no assistance needed    Home safety:  none identified     Hearing impairment: No    Fall risk:  Fallen 2 or more times in the past year?: No  Any fall with injury in the past year?: No      COGNITIVE SCREEN  1) Repeat 3 items (Banana, Sunrise, Chair)    2) Clock draw: NORMAL  3) 3 item recall: Recalls 3 objects  Results: 3 items recalled: COGNITIVE IMPAIRMENT LESS LIKELY    Mini-CogTM Copyright S Ricardo. Licensed by the author for use in [facility] [employer]; reprinted with permission (sophillip@[facility]). All rights reserved.            Hyperlipidemia Follow-Up      Rate your low fat/cholesterol diet?: good    Taking statin?  Yes, muscle aches after starting statin.  In October she is switched herself from Lipitor 40 to Lipitor 20mg  which improved her symptoms significantly.    Other lipid medications/supplements?:  none    Hypertension Follow-up      Outpatient blood pressures are not being checked.    Low Salt Diet: no added salt      Reviewed and updated as needed this visit by clinical staff  Tobacco  Allergies  Meds  Med Hx  Surg Hx  Fam Hx  Soc Hx        Reviewed and updated as needed this visit by Provider  Allergies        Social History   Substance Use Topics     Smoking status: Never Smoker     Smokeless tobacco: Never Used      Alcohol use Yes      Comment: few per year       If you drink alcohol do you typically have >3 drinks per day or >7 drinks per week? No                        Today's PHQ-2 Score:   PHQ-2 ( 1999 Pfizer) 4/11/2018 5/3/2017   Q1: Little interest or pleasure in doing things 0 0   Q2: Feeling down, depressed or hopeless 0 0   PHQ-2 Score 0 0       Do you feel safe in your environment - Yes    Do you have a Health Care Directive?: Yes: Patient states has Advance Directive and will bring in a copy to clinic.    Current providers sharing in care for this patient include:   Patient Care Team:  Conor Arenas MD as PCP - General (Family Practice)    The following health maintenance items are reviewed in Epic and correct as of today:  Health Maintenance   Topic Date Due     PNEUMOCOCCAL (1 of 2 - PCV13) 11/23/2007     BMP Q1 YR  04/07/2018     WELLNESS VISIT Q1 YR  04/07/2018     INFLUENZA VACCINE (SYSTEM ASSIGNED)  09/01/2018     FALL RISK ASSESSMENT  04/11/2019     LIPID SCREEN Q5 YR FEMALE (SYSTEM ASSIGNED)  03/31/2022     ADVANCE DIRECTIVE PLANNING Q5 YRS  04/11/2023     COLON CANCER SCREEN (SYSTEM ASSIGNED)  04/16/2024     TETANUS IMMUNIZATION (SYSTEM ASSIGNED)  04/06/2026     DEXA SCAN SCREENING (SYSTEM ASSIGNED)  Completed     Labs reviewed in EPIC  BP Readings from Last 3 Encounters:   04/11/18 127/77   04/11/18 127/77   05/03/17 112/70    Wt Readings from Last 3 Encounters:   04/11/18 190 lb 12.8 oz (86.5 kg)   05/03/17 188 lb 3.2 oz (85.4 kg)   04/07/17 188 lb (85.3 kg)                  Patient Active Problem List   Diagnosis     Advanced directives, counseling/discussion     Osteopenia     Vertebral artery stenosis     Hyperlipidemia LDL goal <100     History of ischemic vertebrobasilar artery cerebellar stroke     Chronic constipation     Obesity (BMI 30.0-34.9)     HTN, goal below 140/90     Rectocele     Past Surgical History:   Procedure Laterality Date     C VAGINAL HYSTERECTOMY  1982    menometrorrhagia.   "Had a laparoscpy prior to this procedure     CATARACT IOL, RT/LT  2008    bilateral     COLONOSCOPY  2001    patient told to repeat in 10 years     HC DILATION/CURETTAGE DIAG/THER NON OB      D&C's     HC LAPAROSCOPY, SURGICAL; CHOLECYSTECTOMY      Cholecystectomy, Laparoscopic     HYSTERECTOMY, PAP NO LONGER INDICATED       TONSILLECTOMY         Social History   Substance Use Topics     Smoking status: Never Smoker     Smokeless tobacco: Never Used     Alcohol use Yes      Comment: few per year     Family History   Problem Relation Age of Onset     C.A.D. Father       age 64 sudden death     DIABETES Mother      type 2     Cardiovascular Mother      peripheral vascular disease     CEREBROVASCULAR DISEASE Mother      onset age 80s     Hypertension Mother      Obesity Mother      HEART DISEASE Other      grandson with tetrology of Fallot     Lipids Son            Declined pneumonia vaccine and shingles vaccine.    ROS:  CONSTITUTIONAL: NEGATIVE for fever, chills, change in weight  INTEGUMENTARY/SKIN: Complaint of a changing mole on the  right cheek.  Patient is concerned about it.    EYES: NEGATIVE for vision changes or irritation  ENT/MOUTH: NEGATIVE for ear, mouth and throat problems  RESP: NEGATIVE for significant cough or SOB  BREAST: NEGATIVE for masses, tenderness or discharge  CV: NEGATIVE for chest pain, palpitations or peripheral edema  GI: NEGATIVE for nausea, abdominal pain, heartburn, or change in bowel habits  : NEGATIVE for frequency, dysuria, or hematuria  MUSCULOSKELETAL: NEGATIVE for significant arthralgias or myalgia  NEURO: NEGATIVE for weakness, dizziness or paresthesias  ENDOCRINE: NEGATIVE for temperature intolerance, skin/hair changes  HEME: NEGATIVE for bleeding problems  PSYCHIATRIC: NEGATIVE for changes in mood or affect    OBJECTIVE:   /77 (BP Location: Right arm, Patient Position: Chair, Cuff Size: Adult Regular)  Pulse 74  Ht 5' 4\" (1.626 m)  Wt 190 lb 12.8 " "oz (86.5 kg)  SpO2 95%  Breastfeeding? No  BMI 32.75 kg/m2 Estimated body mass index is 32.75 kg/(m^2) as calculated from the following:    Height as of this encounter: 5' 4\" (1.626 m).    Weight as of this encounter: 190 lb 12.8 oz (86.5 kg).  EXAM:   GENERAL APPEARANCE: healthy, alert and no distress  EYES: Eyes grossly normal to inspection, PERRL and conjunctivae and sclerae normal  HENT: ear canals and TM's normal, nose and mouth without ulcers or lesions, oropharynx clear and oral mucous membranes moist  NECK: no adenopathy, no asymmetry, masses, or scars and thyroid normal to palpation  RESP: lungs clear to auscultation - no rales, rhonchi or wheezes  BREAST: normal without masses, tenderness or nipple discharge and no palpable axillary masses or adenopathy  CV: regular rate and rhythm, normal S1 S2, no S3 or S4, no murmur, click or rub, no peripheral edema and peripheral pulses strong  ABDOMEN: soft, nontender, no hepatosplenomegaly, no masses and bowel sounds normal  MS: no musculoskeletal defects are noted and gait is age appropriate without ataxia  SKIN: Dark brown nevus noted on the right cheek  NEURO: Normal strength and tone, sensory exam grossly normal, mentation intact and speech normal  PSYCH: mentation appears normal and affect normal/bright    ASSESSMENT / PLAN:   1. Routine general medical examination at a health care facility    - CBC with platelets    2. Screen for colon cancer    - Fecal colorectal cancer screen (FIT); Future    3. History of ischemic vertebrobasilar artery cerebellar stroke  No episodes of recurrence.  Resume Plavix and aspirin.  - clopidogrel (PLAVIX) 75 MG tablet; TAKE 1 TABLET EVERY DAY  Dispense: 90 tablet; Refill: 3    4. HTN, goal below 140/90  Well-controlled.  - losartan-hydrochlorothiazide (HYZAAR) 50-12.5 MG per tablet; TAKE 1 TABLET EVERY DAY  Dispense: 90 tablet; Refill: 3  - amLODIPine (NORVASC) 2.5 MG tablet; TAKE 1 TABLET EVERY DAY  Dispense: 90 tablet; " "Refill: 3  - Comprehensive metabolic panel    5. Hyperlipidemia LDL goal <100  Questionable control.  Patient lowered her dose of Lipitor from 20-40 mg on her own in October 2017.  Await the test results  - atorvastatin (LIPITOR) 20 MG tablet; Take 1 tablet (20 mg) by mouth daily  Dispense: 90 tablet; Refill: 3  - Lipid Profile  - Comprehensive metabolic panel    6. Facial lesion  Recommended to see skincare clinic with the recently noted change in the facial lesion.  - SKIN CARE REFERRAL    End of Life Planning:  Patient currently has an advanced directive: Yes.  Practitioner is supportive of decision.    COUNSELING:  Reviewed preventive health counseling, as reflected in patient instructions       Regular exercise       Healthy diet/nutrition        Estimated body mass index is 32.75 kg/(m^2) as calculated from the following:    Height as of this encounter: 5' 4\" (1.626 m).    Weight as of this encounter: 190 lb 12.8 oz (86.5 kg).  Weight management plan: Discussed healthy diet and exercise guidelines and patient will follow up in 12 months in clinic to re-evaluate.     reports that she has never smoked. She has never used smokeless tobacco.      Appropriate preventive services were discussed with this patient, including applicable screening as appropriate for cardiovascular disease, diabetes, osteopenia/osteoporosis, and glaucoma.  As appropriate for age/gender, discussed screening for colorectal cancer, prostate cancer, breast cancer, and cervical cancer. Checklist reviewing preventive services available has been given to the patient.    Reviewed patients plan of care and provided an AVS. The Intermediate Care Plan ( asthma action plan, low back pain action plan, and migraine action plan) for Julieta meets the Care Plan requirement. This Care Plan has been established and reviewed with the Patient.    Counseling Resources:  ATP IV Guidelines  Pooled Cohorts Equation Calculator  Breast Cancer Risk Calculator  FRAX " Risk Assessment  ICSI Preventive Guidelines  Dietary Guidelines for Americans, 2010  USDA's MyPlate  ASA Prophylaxis  Lung CA Screening    Conor Arenas MD  [facility]

## 2018-04-11 NOTE — MR AVS SNAPSHOT
After Visit Summary   4/11/2018    Julieta Holm    MRN: [id number]           Patient Information     Date Of Birth          1940        Visit Information        Provider Department      4/11/2018 10:00 AM Lynn Govea MD [facility] [place]        Today's Diagnoses     AK (actinic keratosis)    -  1    Seborrheic keratoses        Solar lentigo        Stucco keratoses          Care Instructions    FUTURE APPOINTMENTS  Follow up in 1 year(s) for a full-body skin cancer screening.    ACTINIC KERATOSES POST-TREATMENT CARE INSTRUCTIONS  Actinic keratoses are benign, scaly or gritty lesions that appear in sun-exposed areas and may progress to skin cancers. For this reason, it is important to treat them before they become cancerous. Liquid nitrogen is the most commonly used and most effective treatment for actinic keratoses; it is mildly uncomfortable when applied to the skin, but the discomfort rapidly subsides.    Post-Treatment:  You may experience burning and/or stinging immediately following the procedure. The discomfort from the procedure may persist over the next 12-24 hours. The area treated will look pinker and slightly swollen before the healing process begins. You may also notice redness, swelling, tenderness, weeping and crusts or scabs. Healing time is approximately 10-14 days.    Blister - You may or may notice blistering from the freezing. If you develop an uncomfortable blister from today's treatment, you may gently puncture this with a needle that has been cleaned with alcohol. However, do not remove the protective skin layer of the blister.    Scab - After a few days, you may notice scaliness or scab formation. Do not pick at the scabs because this may cause slower healing and a permanent scar.    The skin may appear temporarily darker at the treatment site, but this usually fades over a period of months, provided that the area is protected from the  sun.    Care of the areas treated:    Wash the area with a mild cleanser.    Gently pat dry.    Do not rub.     Keep protected from the sun during the healing process and for a full year following treatment as the skin continues to remodel during this time.    Apply Vaseline or Aquaphor ointment sparingly to the site for the first 7 days after treatment.    Do not use Neosporin, as many people eventually develop a medication allergy, that can easily be confused with an infection, to Neomycin.    Return if:  There should not be any residual scaling. If there is any concern that the lesion has persisted after 4-6 weeks, make an appointment for a re-check. Healing time does vary depending on your individual healing process and the area of the body treated. Most patients will be healed in one month.    Signs of Infection:  Thankfully this is rare. However if you notice persistent colored drainage, increasing pain, fever or other signs of infection, please call us at: [phone]    SUN PROTECTION INSTRUCTIONS  Sun damage can lead to skin cancer and premature aging of the skin.      The best way to protect from sun damage to your skin is to avoid the sun during peak hours (10 am - 2 pm) even on overcast days.      Use UPF sun-protective clothing, which while more expensive initially provides longer lasting coverage without having to worry about remembering to re-apply.  1. Wear a wide-brimmed hat and sunglasses.   2. Wear sun-protective clothing.  [employer] and other [employer] make sun protective clothing that are stylish, comfortable and cool. [employer] and other [employer] make UV arm sleeves suitable for golfing, gardening and other activities.      Sunscreen instructions:  1. Use sunscreens with Zinc Oxide, Titanium Dioxide or Avobenzone to protect from UVA rays.  2. Use SPF 30-50+ to protect from UVB rays.  3. Re-apply every 2 hours even if water resistant.  4. Apply on your face every day even when  "cloudy and even in the winter. UVA \"aging rays\" penetrate window glass and are just as strong in the winter as in the summer.    Product Recommendations:    Good examples include: Nish Cooley, Nicholas, Solmery    Good daily moisturizers with SPF: Vanicream, CeraVe.    For sensitive skin, consider : SkinMedica Essential Defense Mineral Shield Broad Spectrum SPF 35      Never use tanning beds. Tanning beds are associated with much higher risks of skin cancer.    All tanning damages the skin. Aim for ivory skin year round and you will have less trouble with your skin in years to come. There is no merit in getting \"a base tan\" before a warm weather vacation, as any tanning indicates your body's response to sun damage.    Stop smoking. Smokers have higher rates of skin cancer and also have premature skin wrinkling.    FYI  You should use about 3 tablespoons of sunscreen to protect your whole body. Thus a typical eight ounce bottle of sunscreen should last 4 applications. Remember, that the SPF rating is compromised if you don t apply enough. Most people only apply 1/2 - 1/3 of the amount they need. Also don t forget areas such as your ears, feet, upper back and harder to reach places. Keep in mind that these amounts should be increased for larger body sizes.    Sunscreens with titanium dioxide and/or zinc oxide in the active ingredients are physical blockers as opposed to chemical blockers. Chemical-free sunscreens should not irritate the skin.    Spray-on sunscreens may be used for touch-up application only, not as a base layer. Also, use with caution around small children due to inhalation risk.    Avoid retinyl palmitate products.    Avoid combination products that include both sunscreen and insect repellant, as sunscreen should be applied every 2 hours, but insect repellant should not be applied as frequently.    SPF means sun protection factor, which is just the degree to which the sunscreen can protect against UVB " "rays. There is no rating system for UVA rays. SPF is calculated as the time skin will burn when sunscreen is applied vs. skin without sunscreen.    Water resistant sunscreens should be re-applied every 1-2 hours.    For more information:  http://www.skincancer.org/prevention/sun-protection/sunscreen/sunscreens-safe-and-effective          Follow-ups after your visit        Future tests that were ordered for you today     Open Future Orders        Priority Expected Expires Ordered    Fecal colorectal cancer screen (FIT) Routine 5/2/2018 7/4/2018 4/11/2018            Who to contact     If you have questions or need follow up information about today's clinic visit or your schedule please contact [facility] [place] directly at [phone].  Normal or non-critical lab and imaging results will be communicated to you by [employer], letter or phone within 4 business days after the clinic has received the results. If you do not hear from us within 7 days, please contact the clinic through [employer] or phone. If you have a critical or abnormal lab result, we will notify you by phone as soon as possible.  Submit refill requests through [employer] or call your pharmacy and they will forward the refill request to us. Please allow 3 business days for your refill to be completed.          Additional Information About Your Visit        [employer] Information     [employer] lets you send messages to your doctor, view your test results, renew your prescriptions, schedule appointments and more. To sign up, go to [email or web address] . Click on \"Log in\" on the left side of the screen, which will take you to the Welcome page. Then click on \"Sign up Now\" on the right side of the page.     You will be asked to enter the access code listed below, as well as some personal information. Please follow the directions to create your username and password.     Your access code is: [email or web address]  Expires: 7/10/2018  9:39 AM     Your access code " will  in 90 days. If you need help or a new code, please call your [place] clinic or [phone].        Care EveryWhere ID     This is your Care EveryWhere ID. This could be used by other organizations to access your [place] medical records  [id number]        Your Vitals Were     Pulse Pulse Oximetry                74 95%           Blood Pressure from Last 3 Encounters:   18 127/77   18 127/77   17 112/70    Weight from Last 3 Encounters:   18 190 lb 12.8 oz (86.5 kg)   17 188 lb 3.2 oz (85.4 kg)   17 188 lb (85.3 kg)              Today, you had the following     No orders found for display         Today's Medication Changes          These changes are accurate as of 18 10:05 AM.  If you have any questions, ask your nurse or doctor.               These medicines have changed or have updated prescriptions.        Dose/Directions    amLODIPine 2.5 MG tablet   Commonly known as:  NORVASC   This may have changed:  See the new instructions.   Used for:  HTN, goal below 140/90   Changed by:  Conor Arenas MD        TAKE 1 TABLET EVERY DAY   Quantity:  90 tablet   Refills:  3       atorvastatin 20 MG tablet   Commonly known as:  LIPITOR   This may have changed:    - medication strength  - how much to take   Used for:  Hyperlipidemia LDL goal <100   Changed by:  Conor Arenas MD        Dose:  20 mg   Take 1 tablet (20 mg) by mouth daily   Quantity:  90 tablet   Refills:  3       clopidogrel 75 MG tablet   Commonly known as:  PLAVIX   This may have changed:  See the new instructions.   Used for:  History of ischemic vertebrobasilar artery cerebellar stroke   Changed by:  Conor Arenas MD        TAKE 1 TABLET EVERY DAY   Quantity:  90 tablet   Refills:  3       losartan-hydrochlorothiazide 50-12.5 MG per tablet   Commonly known as:  HYZAAR   This may have changed:  See the new instructions.   Used for:  HTN, goal below 140/90   Changed by:  Conor Arenas MD        TAKE 1  TABLET EVERY DAY   Quantity:  90 tablet   Refills:  3            Where to get your medicines      These medications were sent to [id number] - [place], [place] - [address]  [address] [address]     Phone:  [phone]     amLODIPine 2.5 MG tablet    atorvastatin 20 MG tablet    clopidogrel 75 MG tablet    losartan-hydrochlorothiazide 50-12.5 MG per tablet                Primary Care Provider Office Phone # Fax #    Conor Arenas [phone]       [address]  [place] [address]        Equal Access to Services     [facility]: Hadii aad ku hadasho Soomaali, waaxda luqadaha, qaybta kaalmada adeegyada, concepcion terrazas . So [facility] [phone].    ATENCIÓN: Si habla español, tiene a way disposición servicios gratuitos de asistencia lingüística. [facility] [phone].    We comply with applicable federal civil rights laws and [place] laws. We do not discriminate on the basis of race, color, national origin, age, disability, sex, sexual orientation, or gender identity.            Thank you!     Thank you for choosing [facility] [place]  for your care. Our goal is always to provide you with excellent care. Hearing back from our patients is one way we can continue to improve our services. Please take a few minutes to complete the written survey that you may receive in the mail after your visit with us. Thank you!             Your Updated Medication List - Protect others around you: Learn how to safely use, store and throw away your medicines at www.disposemymeds.org.          This list is accurate as of 4/11/18 10:05 AM.  Always use your most recent med list.                   Brand Name Dispense Instructions for use Diagnosis    amLODIPine 2.5 MG tablet    NORVASC    90 tablet    TAKE 1 TABLET EVERY DAY    HTN, goal below 140/90       aspirin 81 MG tablet     7 tablet    Take 1 tablet (81 mg) by mouth daily         atorvastatin 20 MG tablet    LIPITOR    90 tablet    Take 1 tablet (20 mg) by mouth daily    Hyperlipidemia LDL goal <100       calcium 600 MG tablet     180 tablet    Take 2 tablets by mouth daily    Routine general medical examination at a health care facility       carboxymethylcellulose sodium 0.5 % Soln ophthalmic solution   Generic drug:  carboxymethylcellulose      Place 1 drop into both eyes 3 times daily as needed for dry eyes        clopidogrel 75 MG tablet    PLAVIX    90 tablet    TAKE 1 TABLET EVERY DAY    History of ischemic vertebrobasilar artery cerebellar stroke       fish oil-omega-3 fatty acids 1000 MG capsule     60    1 daily        losartan-hydrochlorothiazide 50-12.5 MG per tablet    HYZAAR    90 tablet    TAKE 1 TABLET EVERY DAY    HTN, goal below 140/90       FRANCOIS 128 2 % ophthalmic solution   Generic drug:  sodium chloride      Place 1 drop into both eyes At Bedtime.        VITAMIN D3 PO      Take 2,000 Units by mouth daily.

## 2018-04-11 NOTE — MR AVS SNAPSHOT
After Visit Summary   4/11/2018    Julieta Holm    MRN: [id number]           Patient Information     Date Of Birth          1940        Visit Information        Provider Department      4/11/2018 9:20 AM Conor Arenas MD [facility] [place]        Today's Diagnoses     Routine general medical examination at a health care facility    -  1    Screen for colon cancer        History of ischemic vertebrobasilar artery cerebellar stroke        HTN, goal below 140/90        Hyperlipidemia LDL goal <100        Facial lesion          Care Instructions      Preventive Health Recommendations    Female Ages 65 +    Yearly exam:     See your health care provider every year in order to  o Review health changes.   o Discuss preventive care.    o Review your medicines if your doctor has prescribed any.      You no longer need a yearly Pap test unless you've had an abnormal Pap test in the past 10 years. If you have vaginal symptoms, such as bleeding or discharge, be sure to talk with your provider about a Pap test.      Every 1 to 2 years, have a mammogram.  If you are over 69, talk with your health care provider about whether or not you want to continue having screening mammograms.      Every 10 years, have a colonoscopy. Or, have a yearly FIT test (stool test). These exams will check for colon cancer.       Have a cholesterol test every 5 years, or more often if your doctor advises it.       Have a diabetes test (fasting glucose) every three years. If you are at risk for diabetes, you should have this test more often.       At age 65, have a bone density scan (DEXA) to check for osteoporosis (brittle bone disease).    Shots:    Get a flu shot each year.    Get a tetanus shot every 10 years.    Talk to your doctor about your pneumonia vaccines. There are now two you should receive - Pneumovax (PPSV 23) and Prevnar (PCV 13).    Talk to your doctor about the shingles vaccine.    Talk to your doctor  about the hepatitis B vaccine.    Nutrition:     Eat at least 5 servings of fruits and vegetables each day.      Eat whole-grain bread, whole-wheat pasta and brown rice instead of white grains and rice.      Talk to your provider about Calcium and Vitamin D.     Lifestyle    Exercise at least 150 minutes a week (30 minutes a day, 5 days a week). This will help you control your weight and prevent disease.      Limit alcohol to one drink per day.      No smoking.       Wear sunscreen to prevent skin cancer.       See your dentist twice a year for an exam and cleaning.      See your eye doctor every 1 to 2 years to screen for conditions such as glaucoma, macular degeneration and cataracts.          Follow-ups after your visit        Additional Services     SKIN CARE REFERRAL       Your provider has referred you to: [facility]: [facility] [place] [phone]  [email or web address]     Please be aware that coverage of these services is subject to the terms and limitations of your health insurance plan.  Please check with your insurance prior to the appointment to ensure appropriate coverage for any services considered cosmetic in nature or not medically necessary.    Please bring the following with you to your appointment:    (1) Any X-Rays, CTs or MRIs which have been performed.  Contact the facility where they were done to arrange for  prior to your scheduled appointment.  Any new CT, MRI or other procedures ordered by your specialist must be performed at a [facility] facility or coordinated by your clinic's referral office.  (2) List of current medications  (3) This referral request   (4) Any documents/labs given to you for this referral                  Follow-up notes from your care team     Return in about 1 year (around 4/11/2019) for Annual Physical Exam.      Future tests that were ordered for you today     Open Future Orders        Priority Expected  "Expires Ordered    Fecal colorectal cancer screen (FIT) Routine 2018            Who to contact     If you have questions or need follow up information about today's clinic visit or your schedule please contact [facility] [place] directly at [phone].  Normal or non-critical lab and imaging results will be communicated to you by MyChart, letter or phone within 4 business days after the clinic has received the results. If you do not hear from us within 7 days, please contact the clinic through [employer] or phone. If you have a critical or abnormal lab result, we will notify you by phone as soon as possible.  Submit refill requests through [employer] or call your pharmacy and they will forward the refill request to us. Please allow 3 business days for your refill to be completed.          Additional Information About Your Visit        MyChart Information     [employer] lets you send messages to your doctor, view your test results, renew your prescriptions, schedule appointments and more. To sign up, go to [email or web address] . Click on \"Log in\" on the left side of the screen, which will take you to the Welcome page. Then click on \"Sign up Now\" on the right side of the page.     You will be asked to enter the access code listed below, as well as some personal information. Please follow the directions to create your username and password.     Your access code is: [email or web address]  Expires: 7/10/2018  9:39 AM     Your access code will  in 90 days. If you need help or a new code, please call your [place] clinic or [phone].        Care EveryWhere ID     This is your Care EveryWhere ID. This could be used by other organizations to access your [place] medical records  [id number]        Your Vitals Were     Pulse Height Pulse Oximetry Breastfeeding? BMI (Body Mass Index)       74 5' 4\" (1.626 m) 95% No 32.75 kg/m2        Blood Pressure from Last 3 Encounters:   18 127/77 "   05/03/17 112/70   04/07/17 120/82    Weight from Last 3 Encounters:   04/11/18 190 lb 12.8 oz (86.5 kg)   05/03/17 188 lb 3.2 oz (85.4 kg)   04/07/17 188 lb (85.3 kg)              We Performed the Following     CBC with platelets     Comprehensive metabolic panel     Lipid Profile     SKIN CARE REFERRAL          Today's Medication Changes          These changes are accurate as of 4/11/18  9:39 AM.  If you have any questions, ask your nurse or doctor.               These medicines have changed or have updated prescriptions.        Dose/Directions    amLODIPine 2.5 MG tablet   Commonly known as:  NORVASC   This may have changed:  See the new instructions.   Used for:  HTN, goal below 140/90   Changed by:  Conor Arenas MD        TAKE 1 TABLET EVERY DAY   Quantity:  90 tablet   Refills:  3       atorvastatin 20 MG tablet   Commonly known as:  LIPITOR   This may have changed:    - medication strength  - how much to take   Used for:  Hyperlipidemia LDL goal <100   Changed by:  Conor Arenas MD        Dose:  20 mg   Take 1 tablet (20 mg) by mouth daily   Quantity:  90 tablet   Refills:  3       clopidogrel 75 MG tablet   Commonly known as:  PLAVIX   This may have changed:  See the new instructions.   Used for:  History of ischemic vertebrobasilar artery cerebellar stroke   Changed by:  Conor Arenas MD        TAKE 1 TABLET EVERY DAY   Quantity:  90 tablet   Refills:  3       losartan-hydrochlorothiazide 50-12.5 MG per tablet   Commonly known as:  HYZAAR   This may have changed:  See the new instructions.   Used for:  HTN, goal below 140/90   Changed by:  Conor Arenas MD        TAKE 1 TABLET EVERY DAY   Quantity:  90 tablet   Refills:  3            Where to get your medicines      These medications were sent to [facility]  [place] - [address]  [address] [facility] [facility] [address]     Phone:  [phone]     amLODIPine 2.5 MG tablet    atorvastatin 20 MG tablet    clopidogrel 75 MG  tablet    losartan-hydrochlorothiazide 50-12.5 MG per tablet                Primary Care Provider Office Phone # Fax #    Conor Arenas [phone]       [address]  [place] [address]        Equal Access to Services     BESS HERNANDEZ : Hadii aad ku hadasho Soomaali, waaxda luqadaha, qaybta kaalmada adeegyada, waxay idiin hayaan adeeg khlong lapauln ah. So [facility] [phone].    ATENCIÓN: Si habla español, tiene a way disposición servicios gratuitos de asistencia lingüística. Llame al [phone].    We comply with applicable federal civil rights laws and [place] laws. We do not discriminate on the basis of race, color, national origin, age, disability, sex, sexual orientation, or gender identity.            Thank you!     Thank you for choosing [facility] [place]  for your care. Our goal is always to provide you with excellent care. Hearing back from our patients is one way we can continue to improve our services. Please take a few minutes to complete the written survey that you may receive in the mail after your visit with us. Thank you!             Your Updated Medication List - Protect others around you: Learn how to safely use, store and throw away your medicines at www.disposemymeds.org.          This list is accurate as of 4/11/18  9:39 AM.  Always use your most recent med list.                   Brand Name Dispense Instructions for use Diagnosis    amLODIPine 2.5 MG tablet    NORVASC    90 tablet    TAKE 1 TABLET EVERY DAY    HTN, goal below 140/90       aspirin 81 MG tablet     7 tablet    Take 1 tablet (81 mg) by mouth daily        atorvastatin 20 MG tablet    LIPITOR    90 tablet    Take 1 tablet (20 mg) by mouth daily    Hyperlipidemia LDL goal <100       calcium 600 MG tablet     180 tablet    Take 2 tablets by mouth daily    Routine general medical examination at a health care facility       carboxymethylcellulose sodium 0.5 % Soln ophthalmic solution   Generic  drug:  carboxymethylcellulose      Place 1 drop into both eyes 3 times daily as needed for dry eyes        clopidogrel 75 MG tablet    PLAVIX    90 tablet    TAKE 1 TABLET EVERY DAY    History of ischemic vertebrobasilar artery cerebellar stroke       fish oil-omega-3 fatty acids 1000 MG capsule     60    1 daily        losartan-hydrochlorothiazide 50-12.5 MG per tablet    HYZAAR    90 tablet    TAKE 1 TABLET EVERY DAY    HTN, goal below 140/90       FRANCOIS 128 2 % ophthalmic solution   Generic drug:  sodium chloride      Place 1 drop into both eyes At Bedtime.        VITAMIN D3 PO      Take 2,000 Units by mouth daily.

## 2018-04-11 NOTE — NURSING NOTE
"Chief Complaint   Patient presents with     Physical       Initial /77 (BP Location: Right arm, Patient Position: Chair, Cuff Size: Adult Regular)  Pulse 74  Ht 5' 4\" (1.626 m)  Wt 190 lb 12.8 oz (86.5 kg)  SpO2 95%  Breastfeeding? No  BMI 32.75 kg/m2 Estimated body mass index is 32.75 kg/(m^2) as calculated from the following:    Height as of this encounter: 5' 4\" (1.626 m).    Weight as of this encounter: 190 lb 12.8 oz (86.5 kg).  Medication Reconciliation: complete     Lana Guerrero MA     "

## 2018-04-11 NOTE — PROGRESS NOTES
[facility] - PRIMARY CARE SKIN    CC : skin cancer screening (full-body)  SUBJECTIVE:                                                    Julieta Holm is a 77 year old female who presents to clinic today for a full-body skin exam because of a lesion on the right face.    Bothersome lesions noticed by the patient or other skin concerns :  Issue One : A lesion on the right face is dark in color.  Changing color : YES.    Personal history of skin cancer : NO - she has had lesions on the face treated with cryotherapy.  Family history of skin cancer : NO.    Sun Exposure History  Previous history of significant sun exposure: [place].    Occupation : retired,  (indoor).    Refer to electronic medical record (EMR) for past medical history and medications.    INTEGUMENTARY/SKIN: POSITIVE for changing lesions  ROS : 14 point review of systems was negative except the symptoms listed above in the HPI.    This document serves as a record of the services and decisions personally performed and made by Danielle Govea MD. It was created on her behalf by Naseem Khan, a trained medical scribe.  The creation of this document is based on the scribe's personal observations and the provider's statements to the medical scribe.  Naseem Khan, April 11, 2018 9:49 AM      OBJECTIVE:                                                    GENERAL: healthy, alert and no distress  SKIN: Kulkarni Skin Type - I.  This patient was examined from the top of the head to the bottom of the feet  including scalp, face, neck, back, chest, breasts, buttocks, both arms, both legs, both hands, both feet, all 10 fingers and all 10 toes. The dermatoscope was used to help evaluate pigmented lesions.  Skin Pertinent Findings:  Face : Multiple brown, macule(s) most consistent with benign solar lentigo.    Right arm(s) and left arm(s) : Multiple brown, macule(s) most consistent with benign solar lentigo.    Anterior legs :  Multiple brown, macule(s) most consistent with benign solar lentigo. Some stuck-on appearing papules, raised, brown, coarse-textured, round lesion(s) most consistent with seborrheic keratoses. Stucco keratoses.    Back : stuck-on appearing papules, raised, brown, coarse-textured, round lesion(s) most consistent with seborrheic keratoses. brown, macule(s) most consistent with benign solar lentigo. slightly raised, red lesion(s) consistent with capillary hemangioma.    Posterior legs : stucco keratoses.    Significant Findings:  Face : 3 mm in size, erythematous, scaly, non-indurated lesion(s) most consistent with actinic keratoses.  Arms : hypertrophic actinic keratoses.  Name : Liquid Nitrogen Cry-Ac Cryotherapy.  Indication : Pre-malignant lesion.  Location(s) : left lateral face - x1, superior to right upper lip - x1, right jawline - x1, right dorsal wrist - x1; right mid-forearm - x1 .  Completed by : Danielle Govea MD  Note : Discussed natural history of lesion and treatment options. Prior to treatment, we discussed inflammation, tenderness post-procedure, the healing process, and the risks of pain, infection, scarring, blistering, and hypo-/hyperpigmentation after healing. Explained that these lesions may grow back and may need additional treatment or re-treatment. The patient expressed a desire to proceed with cryotherapy.    The lesion(s) was treated with liquid nitrogen Cry-Ac, five second freeze repeated twice with a pause to allow for the area to thaw. If this lesion should recur, then it needs to be re-evaluated.    Patient tolerated the procedure well and left in good condition.  Total number of lesions treated : 5.    Diagnostic Test Results:  none           ASSESSMENT:                                                      Encounter Diagnoses   Name Primary?     AK (actinic keratosis) Yes     Seborrheic keratoses      Solar lentigo      Stucco keratoses          PLAN:                                                     Patient Instructions   FUTURE APPOINTMENTS  Follow up in 1 year(s) for a full-body skin cancer screening.    ACTINIC KERATOSES POST-TREATMENT CARE INSTRUCTIONS  Actinic keratoses are benign, scaly or gritty lesions that appear in sun-exposed areas and may progress to skin cancers. For this reason, it is important to treat them before they become cancerous. Liquid nitrogen is the most commonly used and most effective treatment for actinic keratoses; it is mildly uncomfortable when applied to the skin, but the discomfort rapidly subsides.    Post-Treatment:  You may experience burning and/or stinging immediately following the procedure. The discomfort from the procedure may persist over the next 12-24 hours. The area treated will look pinker and slightly swollen before the healing process begins. You may also notice redness, swelling, tenderness, weeping and crusts or scabs. Healing time is approximately 10-14 days.    Blister - You may or may notice blistering from the freezing. If you develop an uncomfortable blister from today's treatment, you may gently puncture this with a needle that has been cleaned with alcohol. However, do not remove the protective skin layer of the blister.    Scab - After a few days, you may notice scaliness or scab formation. Do not pick at the scabs because this may cause slower healing and a permanent scar.    The skin may appear temporarily darker at the treatment site, but this usually fades over a period of months, provided that the area is protected from the sun.    Care of the areas treated:    Wash the area with a mild cleanser.    Gently pat dry.    Do not rub.     Keep protected from the sun during the healing process and for a full year following treatment as the skin continues to remodel during this time.    Apply Vaseline or Aquaphor ointment sparingly to the site for the first 7 days after treatment.    Do not use Neosporin, as many people eventually develop a  "medication allergy, that can easily be confused with an infection, to Neomycin.    Return if:  There should not be any residual scaling. If there is any concern that the lesion has persisted after 4-6 weeks, make an appointment for a re-check. Healing time does vary depending on your individual healing process and the area of the body treated. Most patients will be healed in one month.    Signs of Infection:  Thankfully this is rare. However if you notice persistent colored drainage, increasing pain, fever or other signs of infection, please call us at: [phone]    SUN PROTECTION INSTRUCTIONS  Sun damage can lead to skin cancer and premature aging of the skin.      The best way to protect from sun damage to your skin is to avoid the sun during peak hours (10 am - 2 pm) even on overcast days.      Use UPF sun-protective clothing, which while more expensive initially provides longer lasting coverage without having to worry about remembering to re-apply.  1. Wear a wide-brimmed hat and sunglasses.   2. Wear sun-protective clothing.  [employer] other [employer] make sun protective clothing that are stylish, comfortable and cool. [employer] other [employer] make UV arm sleeves suitable for golfing, gardening and other activities.      Sunscreen instructions:  1. Use sunscreens with Zinc Oxide, Titanium Dioxide or Avobenzone to protect from UVA rays.  2. Use SPF 30-50+ to protect from UVB rays.  3. Re-apply every 2 hours even if water resistant.  4. Apply on your face every day even when cloudy and even in the winter. UVA \"aging rays\" penetrate window glass and are just as strong in the winter as in the summer.    Product Recommendations:    Good examples include: Blue Lizard, EltaMD, Solbar    Good daily moisturizers with SPF: Vanicream, CeraVe.    For sensitive skin, consider : Skin[employer]ica Essential Defense Mineral Shield Broad Spectrum SPF 35      Never use tanning beds. Tanning beds are " "associated with much higher risks of skin cancer.    All tanning damages the skin. Aim for ivory skin year round and you will have less trouble with your skin in years to come. There is no merit in getting \"a base tan\" before a warm weather vacation, as any tanning indicates your body's response to sun damage.    Stop smoking. Smokers have higher rates of skin cancer and also have premature skin wrinkling.    FYI  You should use about 3 tablespoons of sunscreen to protect your whole body. Thus a typical eight ounce bottle of sunscreen should last 4 applications. Remember, that the SPF rating is compromised if you don t apply enough. Most people only apply 1/2 - 1/3 of the amount they need. Also don t forget areas such as your ears, feet, upper back and harder to reach places. Keep in mind that these amounts should be increased for larger body sizes.    Sunscreens with titanium dioxide and/or zinc oxide in the active ingredients are physical blockers as opposed to chemical blockers. Chemical-free sunscreens should not irritate the skin.    Spray-on sunscreens may be used for touch-up application only, not as a base layer. Also, use with caution around small children due to inhalation risk.    Avoid retinyl palmitate products.    Avoid combination products that include both sunscreen and insect repellant, as sunscreen should be applied every 2 hours, but insect repellant should not be applied as frequently.    SPF means sun protection factor, which is just the degree to which the sunscreen can protect against UVB rays. There is no rating system for UVA rays. SPF is calculated as the time skin will burn when sunscreen is applied vs. skin without sunscreen.    Water resistant sunscreens should be re-applied every 1-2 hours.    For more information:  http://www.skincancer.org/prevention/sun-protection/sunscreen/sunscreens-safe-and-effective    The patient was counseled about sunscreens and sun avoidance. The patient was " counseled to check the skin regularly and report any lesion that is new, changing, itching, scabbing, bleeding or otherwise bothersome. The patient was discharged ambulatory and in stable condition.    Educational brochures given to patient : skin cancer.       PROCEDURES:                                                    None.    TT : 25 minutes.  CT : 15 minutes.      The information in this document, created by the medical scribe for me, accurately reflects the services I personally performed and the decisions made by me. I have reviewed and approved this document for accuracy prior to leaving the patient care area.  Danielle Govea MD April 11, 2018 9:49 AM  [facility] [place]

## 2018-04-12 LAB
ALBUMIN SERPL-MCNC: 3.9 G/DL (ref 3.4–5)
ALP SERPL-CCNC: 67 U/L (ref 40–150)
ALT SERPL W P-5'-P-CCNC: 30 U/L (ref 0–50)
ANION GAP SERPL CALCULATED.3IONS-SCNC: 6 MMOL/L (ref 3–14)
AST SERPL W P-5'-P-CCNC: 26 U/L (ref 0–45)
BILIRUB SERPL-MCNC: 0.8 MG/DL (ref 0.2–1.3)
BUN SERPL-MCNC: 14 MG/DL (ref 7–30)
CALCIUM SERPL-MCNC: 9.5 MG/DL (ref 8.5–10.1)
CHLORIDE SERPL-SCNC: 107 MMOL/L (ref 94–109)
CHOLEST SERPL-MCNC: 207 MG/DL
CO2 SERPL-SCNC: 27 MMOL/L (ref 20–32)
CREAT SERPL-MCNC: 0.96 MG/DL (ref 0.52–1.04)
GFR SERPL CREATININE-BSD FRML MDRD: 57 ML/MIN/1.7M2
GLUCOSE SERPL-MCNC: 102 MG/DL (ref 70–99)
HDLC SERPL-MCNC: 57 MG/DL
LDLC SERPL CALC-MCNC: 118 MG/DL
NONHDLC SERPL-MCNC: 150 MG/DL
POTASSIUM SERPL-SCNC: 4 MMOL/L (ref 3.4–5.3)
PROT SERPL-MCNC: 7.4 G/DL (ref 6.8–8.8)
SODIUM SERPL-SCNC: 140 MMOL/L (ref 133–144)
TRIGL SERPL-MCNC: 161 MG/DL

## 2018-04-13 PROBLEM — N18.30 CKD (CHRONIC KIDNEY DISEASE) STAGE 3, GFR 30-59 ML/MIN (H): Status: ACTIVE | Noted: 2018-04-13

## 2018-04-13 RX ORDER — ATORVASTATIN CALCIUM 40 MG/1
40 TABLET, FILM COATED ORAL DAILY
Qty: 90 TABLET | Refills: 3 | Status: SHIPPED | OUTPATIENT
Start: 2018-04-13

## 2018-04-16 PROCEDURE — 82274 ASSAY TEST FOR BLOOD FECAL: CPT | Performed by: FAMILY MEDICINE

## 2018-04-18 DIAGNOSIS — Z12.11 SCREEN FOR COLON CANCER: ICD-10-CM

## 2018-04-18 LAB — HEMOCCULT STL QL IA: NEGATIVE

## 2018-05-30 ENCOUNTER — MEDICAL CORRESPONDENCE (OUTPATIENT)
Dept: HEALTH INFORMATION MANAGEMENT | Facility: CLINIC | Age: 78
End: 2018-05-30

## 2018-06-04 DIAGNOSIS — I67.2 INTRACRANIAL ATHEROSCLEROSIS: ICD-10-CM

## 2018-06-04 DIAGNOSIS — G45.9 TIA (TRANSIENT ISCHEMIC ATTACK): Primary | ICD-10-CM

## 2018-06-04 DIAGNOSIS — G45.3 AMAUROSIS FUGAX: ICD-10-CM

## 2018-06-04 DIAGNOSIS — R73.09 ELEVATED GLUCOSE: ICD-10-CM

## 2018-08-30 ENCOUNTER — TELEPHONE (OUTPATIENT)
Dept: FAMILY MEDICINE | Facility: CLINIC | Age: 78
End: 2018-08-30

## 2018-08-30 DIAGNOSIS — Z29.89 NEED FOR SUBACUTE BACTERIAL ENDOCARDITIS PROPHYLAXIS: Primary | ICD-10-CM

## 2018-08-30 NOTE — TELEPHONE ENCOUNTER
Pt called and is having dental work done and needs an rx for antiobiotic due to having rheumatic fever in the past. Please send rx [facility] [place]. Any questions, pt can be reached at  [phone]. Thank you.  Apoorva Smith,

## 2018-09-04 RX ORDER — AMOXICILLIN 500 MG/1
2000 CAPSULE ORAL ONCE
Qty: 4 CAPSULE | Refills: 3 | Status: SHIPPED | OUTPATIENT
Start: 2018-09-04 | End: 2018-09-04

## 2018-09-04 NOTE — TELEPHONE ENCOUNTER
Detailed message left with info from provider and return number to call with any questions or concerns.    Ling Gibbs RN  EP Triage

## 2018-10-25 ENCOUNTER — HOSPITAL ENCOUNTER (EMERGENCY)
Facility: CLINIC | Age: 78
Discharge: HOME | End: 2018-10-25
Attending: EMERGENCY MEDICINE | Admitting: EMERGENCY MEDICINE
Payer: MEDICARE

## 2018-10-25 ENCOUNTER — APPOINTMENT (OUTPATIENT)
Dept: CT IMAGING | Facility: CLINIC | Age: 78
End: 2018-10-25
Attending: EMERGENCY MEDICINE
Payer: MEDICARE

## 2018-10-25 ENCOUNTER — APPOINTMENT (OUTPATIENT)
Dept: MRI IMAGING | Facility: CLINIC | Age: 78
End: 2018-10-25
Attending: EMERGENCY MEDICINE
Payer: MEDICARE

## 2018-10-25 VITALS
TEMPERATURE: 98.1 F | SYSTOLIC BLOOD PRESSURE: 124 MMHG | OXYGEN SATURATION: 94 % | BODY MASS INDEX: 34.32 KG/M2 | RESPIRATION RATE: 18 BRPM | WEIGHT: 199.96 LBS | HEART RATE: 64 BPM | DIASTOLIC BLOOD PRESSURE: 74 MMHG

## 2018-10-25 DIAGNOSIS — G51.0 BELL'S PALSY: ICD-10-CM

## 2018-10-25 LAB
ANION GAP SERPL CALCULATED.3IONS-SCNC: 8 MMOL/L (ref 3–14)
APTT PPP: 28 SEC (ref 22–37)
BASOPHILS # BLD AUTO: 0 10E9/L (ref 0–0.2)
BASOPHILS NFR BLD AUTO: 0.4 %
BUN SERPL-MCNC: 14 MG/DL (ref 7–30)
CALCIUM SERPL-MCNC: 9.1 MG/DL (ref 8.5–10.1)
CHLORIDE SERPL-SCNC: 106 MMOL/L (ref 94–109)
CO2 SERPL-SCNC: 27 MMOL/L (ref 20–32)
CREAT BLD-MCNC: 0.9 MG/DL (ref 0.52–1.04)
CREAT SERPL-MCNC: 1.03 MG/DL (ref 0.52–1.04)
DIFFERENTIAL METHOD BLD: NORMAL
EOSINOPHIL # BLD AUTO: 0.1 10E9/L (ref 0–0.7)
EOSINOPHIL NFR BLD AUTO: 1.4 %
ERYTHROCYTE [DISTWIDTH] IN BLOOD BY AUTOMATED COUNT: 12.8 % (ref 10–15)
GFR SERPL CREATININE-BSD FRML MDRD: 52 ML/MIN/1.7M2
GFR SERPL CREATININE-BSD FRML MDRD: 61 ML/MIN/1.7M2
GLUCOSE BLDC GLUCOMTR-MCNC: 115 MG/DL (ref 70–99)
GLUCOSE SERPL-MCNC: 105 MG/DL (ref 70–99)
HCT VFR BLD AUTO: 45.3 % (ref 35–47)
HGB BLD-MCNC: 15.2 G/DL (ref 11.7–15.7)
IMM GRANULOCYTES # BLD: 0 10E9/L (ref 0–0.4)
IMM GRANULOCYTES NFR BLD: 0.4 %
INR PPP: 0.95 (ref 0.86–1.14)
INTERPRETATION ECG - MUSE: NORMAL
LYMPHOCYTES # BLD AUTO: 1.3 10E9/L (ref 0.8–5.3)
LYMPHOCYTES NFR BLD AUTO: 18.3 %
MCH RBC QN AUTO: 32.4 PG (ref 26.5–33)
MCHC RBC AUTO-ENTMCNC: 33.6 G/DL (ref 31.5–36.5)
MCV RBC AUTO: 97 FL (ref 78–100)
MONOCYTES # BLD AUTO: 0.5 10E9/L (ref 0–1.3)
MONOCYTES NFR BLD AUTO: 6.3 %
NEUTROPHILS # BLD AUTO: 5.3 10E9/L (ref 1.6–8.3)
NEUTROPHILS NFR BLD AUTO: 73.2 %
NRBC # BLD AUTO: 0 10*3/UL
NRBC BLD AUTO-RTO: 0 /100
PLATELET # BLD AUTO: 288 10E9/L (ref 150–450)
POTASSIUM SERPL-SCNC: 4 MMOL/L (ref 3.4–5.3)
RBC # BLD AUTO: 4.69 10E12/L (ref 3.8–5.2)
SODIUM SERPL-SCNC: 141 MMOL/L (ref 133–144)
TROPONIN I SERPL-MCNC: <0.015 UG/L (ref 0–0.04)
WBC # BLD AUTO: 7.3 10E9/L (ref 4–11)

## 2018-10-25 PROCEDURE — 70450 CT HEAD/BRAIN W/O DYE: CPT

## 2018-10-25 PROCEDURE — 85025 COMPLETE CBC W/AUTO DIFF WBC: CPT | Performed by: EMERGENCY MEDICINE

## 2018-10-25 PROCEDURE — 00000146 ZZHCL STATISTIC GLUCOSE BY METER IP

## 2018-10-25 PROCEDURE — 93005 ELECTROCARDIOGRAM TRACING: CPT

## 2018-10-25 PROCEDURE — 25000128 H RX IP 250 OP 636: Performed by: EMERGENCY MEDICINE

## 2018-10-25 PROCEDURE — 70553 MRI BRAIN STEM W/O & W/DYE: CPT

## 2018-10-25 PROCEDURE — 84484 ASSAY OF TROPONIN QUANT: CPT | Performed by: EMERGENCY MEDICINE

## 2018-10-25 PROCEDURE — A9585 GADOBUTROL INJECTION: HCPCS | Performed by: EMERGENCY MEDICINE

## 2018-10-25 PROCEDURE — 85730 THROMBOPLASTIN TIME PARTIAL: CPT | Performed by: EMERGENCY MEDICINE

## 2018-10-25 PROCEDURE — 25500064 ZZH RX 255 OP 636: Performed by: EMERGENCY MEDICINE

## 2018-10-25 PROCEDURE — 70498 CT ANGIOGRAPHY NECK: CPT

## 2018-10-25 PROCEDURE — 99285 EMERGENCY DEPT VISIT HI MDM: CPT | Mod: 25

## 2018-10-25 PROCEDURE — 85610 PROTHROMBIN TIME: CPT | Performed by: EMERGENCY MEDICINE

## 2018-10-25 PROCEDURE — 0042T CT HEAD PERFUSION WITH CONTRAST: CPT

## 2018-10-25 PROCEDURE — 80048 BASIC METABOLIC PNL TOTAL CA: CPT | Performed by: EMERGENCY MEDICINE

## 2018-10-25 PROCEDURE — 82565 ASSAY OF CREATININE: CPT

## 2018-10-25 RX ORDER — IOPAMIDOL 755 MG/ML
500 INJECTION, SOLUTION INTRAVASCULAR ONCE
Status: COMPLETED | OUTPATIENT
Start: 2018-10-25 | End: 2018-10-25

## 2018-10-25 RX ORDER — GADOBUTROL 604.72 MG/ML
10 INJECTION INTRAVENOUS ONCE
Status: COMPLETED | OUTPATIENT
Start: 2018-10-25 | End: 2018-10-25

## 2018-10-25 RX ORDER — VALACYCLOVIR HYDROCHLORIDE 1 G/1
1000 TABLET, FILM COATED ORAL 3 TIMES DAILY
Qty: 21 TABLET | Refills: 0 | Status: SHIPPED | OUTPATIENT
Start: 2018-10-25 | End: 2018-11-01

## 2018-10-25 RX ORDER — PREDNISONE 20 MG/1
60 TABLET ORAL DAILY
Qty: 21 TABLET | Refills: 0 | Status: SHIPPED | OUTPATIENT
Start: 2018-10-25 | End: 2018-11-01

## 2018-10-25 RX ORDER — POLYVINYL ALCOHOL 14 MG/ML
1 SOLUTION/ DROPS OPHTHALMIC PRN
Qty: 15 ML | Refills: 0 | Status: SHIPPED | OUTPATIENT
Start: 2018-10-25 | End: 2018-11-08

## 2018-10-25 RX ADMIN — IOPAMIDOL 120 ML: 755 INJECTION, SOLUTION INTRAVENOUS at 11:59

## 2018-10-25 RX ADMIN — SODIUM CHLORIDE, PRESERVATIVE FREE 80 ML: 5 INJECTION INTRAVENOUS at 11:59

## 2018-10-25 RX ADMIN — GADOBUTROL 9 ML: 604.72 INJECTION INTRAVENOUS at 14:23

## 2018-10-25 ASSESSMENT — ENCOUNTER SYMPTOMS
FACIAL ASYMMETRY: 1
HEADACHES: 1
SPEECH DIFFICULTY: 1

## 2018-10-25 NOTE — ED TRIAGE NOTES
Around 8 am noticed R sided facial drooping when trying to put on makeup. Up for the day at 7am. When she met with friends noticed she was slurring speech. Had a headache yesterday in the back of the head and today it is still present but more dull today.

## 2018-10-25 NOTE — ED AVS SNAPSHOT
[facility]    [address] [place] [address]    Phone:  [phone]    Fax:  [phone]                                       Julieta Holm   MRN: [id number]    Department:  [facility]   Date of Visit:  10/25/2018           Patient Information     Date Of Birth          1940        Your diagnoses for this visit were:     Bell's palsy        You were seen by Raji Phelps MD.      Follow-up Information     Follow up with Neurology.    Why:  for re-evaluation of your symptoms        Discharge Instructions         Dawson s Palsy    Bell's Palsy is a problem involving the nerve that controls the muscles on one side of the face.  The cause is unknown, but may be related to inflammation of the nerve. Symptoms usually appear only on the affected side. They may include:    Inability to close the eyelid    Tearing of the eye    Facial drooping    Drooling    Numbness or pain    Changes in taste    Sound sensitivity  Damage to the eye can be a serious problem. The inability to blink can cause the eye to dry out. An ulcer (sore) can then form on the cornea. Also, not blinking means that the eye has no protection from dirt and dust particles.  Treatment involves protecting and moistening the eye. Medicines may also help.  Most persons recover completely within 3 to 6 months. However, the condition sometimes returns months or years later.  Home care    Get plenty of rest and eat a healthy diet to help yourself recover.    Use Artificial Tears frequently during the day and at bedtime to prevent drying. These drops are available without prescription at your drug store.    Wear protective glasses especially when outside to protect from flying debris. Use sunglasses when outdoors.    Tape the eyelid closed at bedtime with a paper tape (available at your pharmacy). This has a very mild adhesive to avoid injury to the lid. This will  protect your eye from injury while you sleep.    Sometimes medicines are prescribed to reduce inflammation or treat specific viral infections of the nerve. If medicines are prescribed, take them exactly as directed. Usually there is a 10-day course of medication that is started as soon as possible. Taking this medicine as prescribed will help with a full recovery.    Use low heat, for example from a heating pad, on the affected area. This can help reduce pain and swelling.    If you are experiencing sever pain, contact your health care provider.  Follow-up care  Follow up with your healthcare provider as advised. If you referred to a specialist, make that appointment promptly.  When to seek medical advice  Call your healthcare provider if any of the following occur:    Severe eye redness    Eye pain    Thick drainage from the eye    Change in vision (such as double vision or losing vision)    Fever over 100.4 F (38 C) or as directed by your healthcare provider    Headache, neck pain, weakness, trouble speaking or walking, or other unexplained symptoms  Date Last Reviewed: 8/23/2015 2000-2017 [employer]. [address] [place] [address]. All rights reserved. This information is not intended as a substitute for professional medical care. Always follow your healthcare professional's instructions.          24 Hour Appointment Hotline       To make an appointment at any [place] clinic, call [phone] ([phone]). If you don't have a family doctor or clinic, we will help you find one. [place] clinics are conveniently located to serve the needs of you and your family.             Review of your medicines      START taking        Dose / Directions Last dose taken    polyvinyl alcohol 1.4 % ophthalmic solution   Commonly known as:  LIQUIFILM TEARS   Dose:  1 drop   Quantity:  15 mL        Place 1 drop into the right eye as needed for dry eyes   Refills:  0        predniSONE 20 MG tablet    Commonly known as:  DELTASONE   Dose:  60 mg   Quantity:  21 tablet        Take 3 tablets (60 mg) by mouth daily for 7 days   Refills:  0        valACYclovir 1000 mg tablet   Commonly known as:  VALTREX   Dose:  1000 mg   Quantity:  21 tablet        Take 1 tablet (1,000 mg) by mouth 3 times daily for 7 days   Refills:  0          Our records show that you are taking the medicines listed below. If these are incorrect, please call your family doctor or clinic.        Dose / Directions Last dose taken    amLODIPine 2.5 MG tablet   Commonly known as:  NORVASC   Quantity:  90 tablet        TAKE 1 TABLET EVERY DAY   Refills:  3        aspirin 81 MG tablet   Dose:  81 mg   Quantity:  7 tablet        Take 1 tablet (81 mg) by mouth daily   Refills:  0        atorvastatin 40 MG tablet   Commonly known as:  LIPITOR   Dose:  40 mg   Quantity:  90 tablet        Take 1 tablet (40 mg) by mouth daily   Refills:  3        calcium 600 MG tablet   Dose:  2 tablet   Quantity:  180 tablet        Take 2 tablets by mouth daily   Refills:  3        carboxymethylcellulose sodium 0.5 % Soln ophthalmic solution   Dose:  1 drop   Generic drug:  carboxymethylcellulose        Place 1 drop into both eyes 3 times daily as needed for dry eyes   Refills:  0        clopidogrel 75 MG tablet   Commonly known as:  PLAVIX   Quantity:  90 tablet        TAKE 1 TABLET EVERY DAY   Refills:  3        fish oil-omega-3 fatty acids 1000 MG capsule   Quantity:  60        1 daily   Refills:  11        losartan-hydrochlorothiazide 50-12.5 MG per tablet   Commonly known as:  HYZAAR   Quantity:  90 tablet        TAKE 1 TABLET EVERY DAY   Refills:  3        FRANCOIS 128 2 % ophthalmic solution   Dose:  1 drop   Generic drug:  sodium chloride        Place 1 drop into both eyes At Bedtime.   Refills:  0        VITAMIN D3 PO   Dose:  2000 Units        Take 2,000 Units by mouth daily.   Refills:  0                Prescriptions were sent or printed at these locations (3  Prescriptions)                   Other Prescriptions                Printed at Department/Unit printer (3 of 3)         polyvinyl alcohol (LIQUIFILM TEARS) 1.4 % ophthalmic solution               predniSONE (DELTASONE) 20 MG tablet               valACYclovir (VALTREX) 1000 mg tablet                Procedures and tests performed during your visit     Basic metabolic panel    CBC with platelets differential    CT Head Perfusion w Contrast    CT Head w/o Contrast    CTA Head Neck with Contrast    Creatinine POCT    EKG 12-lead, tracing only    Glucose by meter    INR    MR Brain w/o & w Contrast    Partial thromboplastin time    Troponin I      Orders Needing Specimen Collection     None      Pending Results     Date and Time Order Name Status Description    10/25/2018 1233 MR Brain w/o & w Contrast Preliminary     10/25/2018 1149 CT Head Perfusion w Contrast Preliminary     10/25/2018 1149 CT Head w/o Contrast Preliminary     10/25/2018 1149 CTA Head Neck with Contrast Preliminary             Pending Culture Results     No orders found from 10/23/2018 to 10/26/2018.            Pending Results Instructions     If you had any lab results that were not finalized at the time of your Discharge, you can call the ED Lab Result RN at [phone]. You will be contacted by this team for any positive Lab results or changes in treatment. The nurses are available 7 days a week from 10A to 6:30P.  You can leave a message 24 hours per day and they will return your call.        Test Results From Your Hospital Stay        10/25/2018  1:04 PM      Narrative     CTA HEAD NECK WITH CONTRAST 10/25/2018 12:12 PM     HISTORY: Right facial droop. Code stroke.    TECHNIQUE: Axial images were obtained through the head and neck  without and with intravenous contrast. 70 mL of Isovue-370 was given.  Multiplanar reconstructions were performed. 3-D reconstructions off a  remote workstation for CT angiography were also acquired. Carotid  stenoses  were evaluated by comparing the caliber of the proximal  internal carotid artery to the caliber of the distal internal carotid  artery. Radiation dose for this scan was reduced using automated  exposure control, adjustment of the mA and/or kV according to patient  size, or iterative reconstruction technique.    FINDINGS:  Brachiocephalic vessels: There is minimal plaque off the thoracic arch  and proximal brachiocephalic vessels, but there is no stenosis.    Right carotid system: Mild to moderate calcific plaque is present  resulting in approximately 30% stenosis of the proximal right internal  carotid artery.    Left carotid system: Moderate calcific plaque is present resulting in  approximately 50% stenosis of the proximal internal carotid artery.    Right vertebral artery: Mild plaque is present at the origin. There is  no stenosis or dissection.    Left vertebral artery: Normal.    [place]: There is atherosclerotic disease of the carotid  siphon regions bilaterally without any significant stenosis. [place] is otherwise normal. 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 The anterior cerebral arteries fill primarily  off the left A1 segment. There is no evidence for aneurysm,  thromboembolism, or dissection.    Other findings: Postcontrast images through the brain do not show any  abnormal areas of enhancement. Images through the neck and upper chest  are unremarkable as visualized. Degenerative changes noted in the  spine.        Impression     IMPRESSION:  1. Atherosclerotic disease involving both carotid bifurcations with  50% stenosis of the left internal carotid artery.  2. No evidence for significant stenosis, dissection, thromboembolism,  or aneurysm.         10/25/2018 12:07 PM      Narrative     CT SCAN OF THE HEAD WITHOUT CONTRAST   10/25/2018 12:04 PM     HISTORY: CODE STROKE.     TECHNIQUE: Axial images of the head and coronal reformations without  IV contrast  material.  Radiation dose for this scan was reduced using  automated exposure control, adjustment of the mA and/or kV according  to patient size, or iterative reconstruction technique.    COMPARISON: None.    FINDINGS: The ventricles are normal in size, shape and configuration.  The brain parenchyma and subarachnoid spaces are normal. There is no  evidence of intracranial hemorrhage, mass, acute infarct or anomaly.     The visualized portions of the sinuses and mastoids appear normal.  There is no evidence of trauma. There are some vascular calcifications  at the skull base.        Impression     IMPRESSION: Negative CT scan of the head.           10/25/2018 12:23 PM      Narrative     CT HEAD PERFUSION WITH CONTRAST 10/25/2018 12:12 PM     HISTORY: CODE STROKE.     TECHNIQUE: Axial images were obtained through the brain with  intravenous contrast for CT brain perfusion imaging. 50 mL of  Isovue-370 was given.    FINDINGS: Cerebral blood flow, cerebral blood volume, and mean transit  time maps are symmetric. There is no evidence for ischemia or infarct.        Impression     IMPRESSION: Normal CT brain perfusion imaging.           10/25/2018 11:56 AM      Component Results     Component Value Ref Range & Units Status    Glucose 115 (H) 70 - 99 mg/dL Final         10/25/2018 11:56 AM      Component Results     Component Value Ref Range & Units Status    Creatinine 0.9 0.52 - 1.04 mg/dL Final    GFR Estimate 61 >60 mL/min/1.7m2 Final    GFR Estimate If Black 74 >60 mL/min/1.7m2 Final         10/25/2018 12:38 PM      Component Results     Component Value Ref Range & Units Status    Sodium 141 133 - 144 mmol/L Final    Potassium 4.0 3.4 - 5.3 mmol/L Final    Chloride 106 94 - 109 mmol/L Final    Carbon Dioxide 27 20 - 32 mmol/L Final    Anion Gap 8 3 - 14 mmol/L Final    Glucose 105 (H) 70 - 99 mg/dL Final    Urea Nitrogen 14 7 - 30 mg/dL Final    Creatinine 1.03 0.52 - 1.04 mg/dL Final    GFR Estimate 52 (L) >60  mL/min/1.7m2 Final    Non  GFR Calc    GFR Estimate If Black 63 >60 mL/min/1.7m2 Final    African American GFR Calc    Calcium 9.1 8.5 - 10.1 mg/dL Final         10/25/2018 12:33 PM      Component Results     Component Value Ref Range & Units Status    INR 0.95 0.86 - 1.14 Final    Reviewed, acceptable         10/25/2018 12:33 PM      Component Results     Component Value Ref Range & Units Status    PTT 28 22 - 37 sec Final    Reviewed, acceptable         10/25/2018 12:50 PM      Component Results     Component Value Ref Range & Units Status    Troponin I ES <0.015 0.000 - 0.045 ug/L Final    The 99th percentile for upper reference range is 0.045 ug/L.  Troponin values   in the range of 0.045 - 0.120 ug/L may be associated with risks of adverse   clinical events.           10/25/2018 12:18 PM      Component Results     Component Value Ref Range & Units Status    WBC 7.3 4.0 - 11.0 10e9/L Final    RBC Count 4.69 3.8 - 5.2 10e12/L Final    Hemoglobin 15.2 11.7 - 15.7 g/dL Final    Hematocrit 45.3 35.0 - 47.0 % Final    MCV 97 78 - 100 fl Final    MCH 32.4 26.5 - 33.0 pg Final    MCHC 33.6 31.5 - 36.5 g/dL Final    RDW 12.8 10.0 - 15.0 % Final    Platelet Count 288 150 - 450 10e9/L Final    Diff Method Automated Method  Final    % Neutrophils 73.2 % Final    % Lymphocytes 18.3 % Final    % Monocytes 6.3 % Final    % Eosinophils 1.4 % Final    % Basophils 0.4 % Final    % Immature Granulocytes 0.4 % Final    Nucleated RBCs 0 0 /100 Final    Absolute Neutrophil 5.3 1.6 - 8.3 10e9/L Final    Absolute Lymphocytes 1.3 0.8 - 5.3 10e9/L Final    Absolute Monocytes 0.5 0.0 - 1.3 10e9/L Final    Absolute Eosinophils 0.1 0.0 - 0.7 10e9/L Final    Absolute Basophils 0.0 0.0 - 0.2 10e9/L Final    Abs Immature Granulocytes 0.0 0 - 0.4 10e9/L Final    Absolute Nucleated RBC 0.0  Final         10/25/2018  2:32 PM      Narrative     MRI BRAIN WITHOUT AND WITH CONTRAST  10/25/2018 2:26 PM    HISTORY:  Right facial  weakness;  question [place] versus stroke.     TECHNIQUE:  Multiplanar, multisequence MRI of the brain without and  with 9 mL Gadavist.    COMPARISON: 3/28/2017.    FINDINGS: Diffusion-weighted images are normal. Patchy white matter  changes are seen in both hemispheres without mass effect. These are  also not associated with any enhancement. Old left cerebellar linear  infarct again noted. There is some mild cerebral atrophy. Brain  parenchyma is otherwise normal. Postcontrast images do not show any  abnormal areas of enhancement or any focal mass lesions. Vascular  structures are patent at the skull base.        Impression     IMPRESSION:  1. No evidence for intracranial hemorrhage, acute infarct, or any  focal mass lesions.  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  3. Scattered nonspecific white matter changes which are not associated  with any mass effect.  4. Mild cerebral atrophy.                Clinical Quality Measure: Blood Pressure Screening     Your blood pressure was checked while you were in the emergency department today. The last reading we obtained was  BP: 123/74 . Please read the guidelines below about what these numbers mean and what you should do about them.  If your systolic blood pressure (the top number) is less than 120 and your diastolic blood pressure (the bottom number) is less than 80, then your blood pressure is normal. There is nothing more that you need to do about it.  If your systolic blood pressure (the top number) is 120-139 or your diastolic blood pressure (the bottom number) is 80-89, your blood pressure may be higher than it should be. You should have your blood pressure rechecked within a year by a primary care provider.  If your systolic blood pressure (the top number) is 140 or greater or your diastolic blood pressure (the bottom number) is 90 or greater, you may have high blood pressure. High blood pressure is treatable, but if left untreated over time it can put you at risk for heart  attack, stroke, or kidney failure. You should have your blood pressure rechecked by a primary care provider within the next 4 weeks.  If your provider in the emergency department today gave you specific instructions to follow-up with your doctor or provider even sooner than that, you should follow that instruction and not wait for up to 4 weeks for your follow-up visit.        Thank you for choosing [facility]       Thank you for choosing [facility] for your care. Our goal is always to provide you with excellent care. Hearing back from our patients is one way we can continue to improve our services. Please take a few minutes to complete the written survey that you may receive in the mail after you visit with us. Thank you!        [employer] Information     [employer] gives you secure access to your electronic health record. If you see a primary care provider, you can also send messages to your care team and make appointments. If you have questions, please call your primary care clinic.  If you do not have a primary care provider, please call [phone] and they will assist you.        Care EveryWhere ID     This is your Care EveryWhere ID. This could be used by other organizations to access your [facility] medical records  [id number]        Equal Access to Services     BESS HERNANDEZ : Etta Biswas, julia osuna, concepcion guadalupe. [facility] [phone].    ATENCIÓN: Si habla español, tiene a way disposición servicios gratuitos de asistencia lingüística. Makayla al [phone].    We comply with applicable federal civil rights laws and [place] laws. We do not discriminate on the basis of race, color, national origin, age, disability, sex, sexual orientation, or gender identity.            After Visit Summary       This is your record. Keep this with you and show to your community pharmacist(s) and doctor(s) at your next visit.

## 2018-10-25 NOTE — ED AVS SNAPSHOT
[facility] Emergency Department    [address]    [facility] [address]    Phone:  [phone]    Fax:  [phone]                                       Julieta Holm   MRN: [id number]    Department:  [facility] Emergency Department   Date of Visit:  10/25/2018           After Visit Summary Signature Page     I have received my discharge instructions, and my questions have been answered. I have discussed any challenges I see with this plan with the nurse or doctor.    ..........................................................................................................................................  Patient/Patient Representative Signature      ..........................................................................................................................................  Patient Representative Print Name and Relationship to Patient    ..................................................               ................................................  Date                                   Time    ..........................................................................................................................................  Reviewed by Signature/Title    ...................................................              ..............................................  Date                                               Time          22EPIC Rev 08/18

## 2018-10-25 NOTE — DISCHARGE INSTRUCTIONS
Dawson s Palsy    Bell's Palsy is a problem involving the nerve that controls the muscles on one side of the face.  The cause is unknown, but may be related to inflammation of the nerve. Symptoms usually appear only on the affected side. They may include:    Inability to close the eyelid    Tearing of the eye    Facial drooping    Drooling    Numbness or pain    Changes in taste    Sound sensitivity  Damage to the eye can be a serious problem. The inability to blink can cause the eye to dry out. An ulcer (sore) can then form on the cornea. Also, not blinking means that the eye has no protection from dirt and dust particles.  Treatment involves protecting and moistening the eye. Medicines may also help.  Most persons recover completely within 3 to 6 months. However, the condition sometimes returns months or years later.  Home care    Get plenty of rest and eat a healthy diet to help yourself recover.    Use Artificial Tears frequently during the day and at bedtime to prevent drying. These drops are available without prescription at your drug store.    Wear protective glasses especially when outside to protect from flying debris. Use sunglasses when outdoors.    Tape the eyelid closed at bedtime with a paper tape (available at your pharmacy). This has a very mild adhesive to avoid injury to the lid. This will protect your eye from injury while you sleep.    Sometimes medicines are prescribed to reduce inflammation or treat specific viral infections of the nerve. If medicines are prescribed, take them exactly as directed. Usually there is a 10-day course of medication that is started as soon as possible. Taking this medicine as prescribed will help with a full recovery.    Use low heat, for example from a heating pad, on the affected area. This can help reduce pain and swelling.    If you are experiencing sever pain, contact your health care provider.  Follow-up care  Follow up with your healthcare provider as advised.  If you referred to a specialist, make that appointment promptly.  When to seek medical advice  Call your healthcare provider if any of the following occur:    Severe eye redness    Eye pain    Thick drainage from the eye    Change in vision (such as double vision or losing vision)    Fever over 100.4 F (38 C) or as directed by your healthcare provider    Headache, neck pain, weakness, trouble speaking or walking, or other unexplained symptoms  Date Last Reviewed: 8/23/2015 2000-2017 [employer]. [address] [place] [address]. All rights reserved. This information is not intended as a substitute for professional medical care. Always follow your healthcare professional's instructions.

## 2018-10-25 NOTE — CONSULTS
[facility]      Stroke Consult Note    HPI  Julieta Holm is a 77 year old female with pmhx of stroke 5 years ago (cerebellar), CKD, HTN, obesity who presented to the ED after Rt facial weakness. Patient said she have been experiancing Rt sided headache radiating to her ear for the last 2 days. Today when she looked into the mirror to put makeup she noticed Rt eye looked different, then minutes later toniced numbness in her Rt lower face and felt Rt face was weak. She also feels that her speech was slurring initially. She denies increase hearing, no change in taste. No diplopia, no dizziness, gait changes, no focal weakness. The onset of the symptom was little vague and might be there before.    Reason for Consult:  Stroke Code     Stroke code activated 10/25/18   1148   First stroke provider response 10/25/18   1152   Tele service began 10/25/18   1200   Tele service ended 10/25/18   1228   Last known normal 10/25/18   0700   Time of discovery  (or onset of symptoms)  10/25/18   0800   Head CT read by me 10/25/18   1205   Was stroke code de-escalated? Yes 10/25/18 1230  symptoms not likely caused by stroke        Telestroke Service Details  Type of service telemedicine diagnostic assessment of acute neurological changes   Reason telemedicine is appropriate patient requires assessment with a specialist for diagnosis and treatment of neurological symptoms   Mode of transmission secure interactive audio and video communication per [place]   Originating site (patient location) [facility]    Distant site (provider location) [facility]     TPA Treatment   Not given due to outside the time window, stroke mimic: bells .    Endovascular Treatment  Not initiated due to absence of proximal vessel occlusion    Stroke Scales:  NIHSS  Interval and Comments baseline   1a. Level of Consciousness 0-->Alert: keenly responsive   1b. LOC Questions 0-->Answers both questions correctly    1c. LOC Commands 0-->Performs both tasks correctly   2.   Best Gaze 0-->Normal   3.   Visual 0-->No visual loss   4.   Facial Palsy 2-->Partial paralysis (total or near-total paralysis of lower face)   5a. Motor Arm, Left 0-->No drift: limb holds 90 (or 45) degrees for full 10 secs   5b. Motor Arm, Right 0-->No drift: limb holds 90 (or 45) degrees for full 10 secs   6a. Motor Leg, Left 0-->No drift: leg holds 30 degree position for full 5 secs   6b. Motor Leg, right 0-->No drift: leg holds 30 degree position for full 5 secs   7.   Limb Ataxia 0-->Absent   8.   Sensory 0-->Normal: no sensory loss   9.   Best Language 0-->No aphasia: normal   10. Dysarthria 0-->Normal   11. Extinction and Inattention  0-->No abnormality   Total 2        Code Status:  Prior    Impression:  Dx: Rt face weakness (forhead also involved.). Most likely Sterling palsy but due to history of stroke and numbness in the Rt lower face I would suggest doing an MRI to exclude a stroke.     Recommendations:  MRI brain with/without contrast  If MRI is negative its most likely Sterling palsy. By then you can initiate steroid and antiviral therapy and to be managed as outpatient.     Patient Follow-up:    Followup with PCP    Please contact the Stroke Service with any questions.    Isaias Ramirez MD  Stroke Neurology  October 25, 2018     Text Page ([phone])  __________________________________________________    Past Medical History:   Diagnosis Date     Cerebellar stroke, acute (H) 3/2013    2 tiny infarcts left cerebellum with prominent visual and balance sx     Chronic constipation     managed with diet     CKD (chronic kidney disease) stage 3, GFR 30-59 ml/min 4/2014    likely due to hypertension, GFR 52-70, creat 0.81-1.05     Essential hypertension, benign 2001    stopped meds 2008. Restarted 2013 after stroke     Mixed hyperlipidemia      KELLY (obstructive sleep apnea) 2013    mild, AHI 8.6, did not tolerate CPAP, but is using dental appliance      Osteopenia 2015    femoral neck     Other chest pain 4/2003    nonischemic stress echo, but execised only 3 min     Rheumatic fever without mention of heart involvement 1972     Vertebral artery stenosis 3/2013    80% stenosis distal lt vertebral artery       Past Surgical History:   Procedure Laterality Date     C VAGINAL HYSTERECTOMY  1982    menometrorrhagia.  Had a laparoscpy prior to this procedure     CATARACT IOL, RT/LT  9/2008    bilateral     COLONOSCOPY  9/2001    patient told to repeat in 10 years     HC DILATION/CURETTAGE DIAG/THER NON OB      D&C's     HC LAPAROSCOPY, SURGICAL; CHOLECYSTECTOMY  2002    Cholecystectomy, Laparoscopic     HYSTERECTOMY, PAP NO LONGER INDICATED  1982     TONSILLECTOMY  1948       Medications   No current facility-administered medications for this encounter.      Current Outpatient Prescriptions   Medication Sig     polyvinyl alcohol (LIQUIFILM TEARS) 1.4 % ophthalmic solution Place 1 drop into the right eye as needed for dry eyes     predniSONE (DELTASONE) 20 MG tablet Take 3 tablets (60 mg) by mouth daily for 7 days     valACYclovir (VALTREX) 1000 mg tablet Take 1 tablet (1,000 mg) by mouth 3 times daily for 7 days     amLODIPine (NORVASC) 2.5 MG tablet TAKE 1 TABLET EVERY DAY     aspirin 81 MG tablet Take 1 tablet (81 mg) by mouth daily     atorvastatin (LIPITOR) 40 MG tablet Take 1 tablet (40 mg) by mouth daily     calcium 600 MG tablet Take 2 tablets by mouth daily     carboxymethylcellulose (CARBOXYMETHYLCELLULOSE SODIUM) 0.5 % SOLN ophthalmic solution Place 1 drop into both eyes 3 times daily as needed for dry eyes     Cholecalciferol (VITAMIN D3 PO) Take 2,000 Units by mouth daily.     clopidogrel (PLAVIX) 75 MG tablet TAKE 1 TABLET EVERY DAY     FISH OIL 1000 MG OR CAPS 1 daily     losartan-hydrochlorothiazide (HYZAAR) 50-12.5 MG per tablet TAKE 1 TABLET EVERY DAY     sodium chloride (FRANCOIS 128) 2 % ophthalmic solution Place 1 drop into both eyes At Bedtime.        No current facility-administered medications for this encounter.      Current Outpatient Prescriptions   Medication Sig     polyvinyl alcohol (LIQUIFILM TEARS) 1.4 % ophthalmic solution Place 1 drop into the right eye as needed for dry eyes     predniSONE (DELTASONE) 20 MG tablet Take 3 tablets (60 mg) by mouth daily for 7 days     valACYclovir (VALTREX) 1000 mg tablet Take 1 tablet (1,000 mg) by mouth 3 times daily for 7 days     amLODIPine (NORVASC) 2.5 MG tablet TAKE 1 TABLET EVERY DAY     aspirin 81 MG tablet Take 1 tablet (81 mg) by mouth daily     atorvastatin (LIPITOR) 40 MG tablet Take 1 tablet (40 mg) by mouth daily     calcium 600 MG tablet Take 2 tablets by mouth daily     carboxymethylcellulose (CARBOXYMETHYLCELLULOSE SODIUM) 0.5 % SOLN ophthalmic solution Place 1 drop into both eyes 3 times daily as needed for dry eyes     Cholecalciferol (VITAMIN D3 PO) Take 2,000 Units by mouth daily.     clopidogrel (PLAVIX) 75 MG tablet TAKE 1 TABLET EVERY DAY     FISH OIL 1000 MG OR CAPS 1 daily     losartan-hydrochlorothiazide (HYZAAR) 50-12.5 MG per tablet TAKE 1 TABLET EVERY DAY     sodium chloride (FRANCOIS 128) 2 % ophthalmic solution Place 1 drop into both eyes At Bedtime.       Allergies   Allergies   Allergen Reactions     Lisinopril      Cough       Nabumetone GI Disturbance       Family History   Family History     Problem (# of Occurrences) Relation (Name,Age of Onset)    C.A.D. 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[employer]     Social  History Main Topics     Smoking status: Never Smoker     Smokeless tobacco: Never Used     Alcohol use Yes      Comment: few per year     Drug use: No     Sexual activity: Yes     Partners: Male      Comment: same sexual partner since      Other Topics Concern      Service No     Blood Transfusions No     Caffeine Concern No     1 tea per day     Occupational Exposure Yes     Hobby Hazards No     Sleep Concern Yes     variable     Stress Concern Yes     Weight Concern Yes     Special Diet No     low fat, yogurt/3 glasses milk daily     Back Care No     Exercise Yes     walks 40 min 5 days per day     Bike Helmet No     Seat Belt Yes     Self-Exams Yes     Social History Narrative    Lives with .  Retired 2005.  Children ages 46 and 48.  8 grandchildren, 7 living, one  congenital heart disease.          PHYSICAL EXAMINATION:  B/P:     124/74 (10/25/18 1454)         Pulse: 64 (10/25/18 1149)   Resp:  18 (10/25/18 1149)  Temp: 98.1  F (36.7  C)   Temporal (10/25/18 1510)    Neurologic  Mental Status:  fully alert, attentive and oriented, follows commands, speech clear and fluent  Cranial Nerves:  visual fields intact, PERRL, EOMI with normal smooth pursuit, facial sensation intact and symmetric, hearing not formally tested but intact to conversation, no dysarthria, shoulder shrug strong bilaterally, tongue protrusion midline Mild Rt forehead and lower face weakness. Rt eye reduced blink.  Motor:  no abnormal movements, normal tone throughout, normal muscle bulk, no pronator drift, normal and symmetric rapid finger tapping, able to move all limbs spontaneously  Reflexes:  unable to test (telestroke)  Sensory:  intact/symmetric to light touch and pin prick throughout upper and lower extremities  Coordination:  FNF and HS intact without dysmetria  Station/Gait:  unable to test (telestroke)    Labs/Imaging:  Labs and imaging were reviewed and used in developing plan; pertinent results included.   Data   CBC  WBC (10e9/L)   Date Value   10/25/2018 7.3   04/11/2018 5.5   03/28/2017 6.2    RBC Count (10e12/L)   Date Value   10/25/2018 4.69   04/11/2018 4.64   03/28/2017 4.18    Hemoglobin (g/dL)   Date Value   10/25/2018 15.2   04/11/2018 14.6   03/28/2017 12.0      Hematocrit (%)   Date Value   10/25/2018 45.3   04/11/2018 43.9   03/28/2017 37.5    Platelet Count (10e9/L)   Date Value   10/25/2018 288   04/11/2018 252   03/28/2017 284         BMP  Sodium (mmol/L)   Date Value   10/25/2018 141   04/11/2018 140   04/07/2017 141    Potassium (mmol/L)   Date Value   10/25/2018 4.0   04/11/2018 4.0   04/07/2017 3.9    Chloride (mmol/L)   Date Value   10/25/2018 106   04/11/2018 107   04/07/2017 105      Carbon Dioxide (mmol/L)   Date Value   10/25/2018 27   04/11/2018 27   04/07/2017 28    Glucose (mg/dL)   Date Value   10/25/2018 105 (H)   04/11/2018 102 (H)   04/07/2017 103 (H)    Urea Nitrogen (mg/dL)   Date Value   10/25/2018 14   04/11/2018 14   04/07/2017 12      Creatinine (mg/dL)   Date Value   10/25/2018 1.03   04/11/2018 0.96   04/07/2017 0.92    Calcium (mg/dL)   Date Value   10/25/2018 9.1   04/11/2018 9.5   04/07/2017 9.6         INR Troponin I A1C   INR (no units)   Date Value   10/25/2018 0.95   04/06/2013 0.98   08/21/2007 0.86    Troponin I ES (ug/L)   Date Value   10/25/2018 <0.015   03/28/2017     <0.015  The 99th percentile for upper reference range is 0.045 ug/L.  Troponin values in   the range of 0.045 - 0.120 ug/L may be associated with risks of adverse   clinical events.     03/20/2013 <0.012    Hemoglobin A1C (%)   Date Value   03/31/2017 5.9        Liver Panel  Protein Total (g/dL)   Date Value   04/11/2018 7.4   03/28/2017 7.1   04/06/2016 7.8    Albumin (g/dL)   Date Value   04/11/2018 3.9   03/28/2017 3.4   04/06/2016 3.8    Bilirubin Total (mg/dL)   Date Value   04/11/2018 0.8   03/28/2017 0.5   04/06/2016 0.7      Alkaline Phosphatase (U/L)   Date Value   04/11/2018 67   03/28/2017 68    04/06/2016 66    AST (U/L)   Date Value   04/11/2018 26   03/28/2017 25   04/06/2016 15    ALT (U/L)   Date Value   04/11/2018 30   03/28/2017 28   04/06/2016 22      No results found for: BILIDIRECT       Lipid Profile  Cholesterol (mg/dL)   Date Value   04/11/2018 207 (H)   03/31/2017 154   04/06/2016 198    HDL Cholesterol (mg/dL)   Date Value   04/11/2018 57   03/31/2017 56   04/06/2016 77    LDL Cholesterol Calculated (mg/dL)   Date Value   04/11/2018 118 (H)   03/31/2017 71   04/06/2016 100 (H)      Triglycerides (mg/dL)   Date Value   04/11/2018 161 (H)   03/31/2017 134   04/06/2016 104    Cholesterol/HDL Ratio (no units)   Date Value   11/19/2014 3.0   06/23/2014 3.5   04/16/2014 3.4              I spent 40 minutes to supervise a stroke code without critical care and with > 50% of time spent in coordination of care and counseling.  I personally reviewed all relevant labs and neuroimaging.

## 2018-10-25 NOTE — ED PROVIDER NOTES
History     Chief Complaint:    Facial Droop and Slurred Speech    HPI   Julieta Holm is a 77 year old female with a history of stoke, CKD, hypertension, and hyperlipidemia, who presents with facial droop and slurred speech. The patient reports that yesterday she started to develop a bad right sided headache that radiated into her ear and jaw. Today, she woke up around 0700 am and took her medications as usual, around 0800 she was putting on make up when she noticed her eye was drooping, but did not have concern for it as sometimes her eye will droop. She continues to state that she has guests over after that when she noticed her speech was slurred. The patient recorded her blood pressure as 116/87. Upon arrival, the patient notes that she has a slight headache now and has been taking tylenol which numbs the pain but does not resolve it. The  of the patient annotates that her speech does sound slightly slurred, although states she has missing teeth that can cause that symptoms occasionally. The patient denies any other symptoms or taking aspirin.     5 years ago, March 20, the patient had a stroke in which is detailed as her feeling light headed with a syncopal episode. She was sent to the emergency room immediately after.    Allergies:  Lisinopril  Nabumetone     Medications:    Norvasc  Lipitor  Carboxymethylcellulose sodium  Plavix  Hyzaar  [place] 128     Past Medical History:    Cerebellar stroke, acute  Chronic constipation  CKD  Hypertension  Mixed hyperlipidemia  KELLY  Chset pain  Rheumatic fever without mention of heart involvement  Vertebral artery stenosis     Past Surgical History:    Catarct IOL  Colonoscopy  HC dilation/curettage diag    Family History:    Mother: C.A.D., Diabetes type II, Cardiovascular Disease, Hypertension, Obesity  Son: Lipids  Grandson: Heart Disease (tetrology of Fallot)    Social History:  The patient was accompanied to the ED by .  Smoking Status: Never  Smoker  Smokeless Tobacco: Never Used  Alcohol Use: Yes  Marital Status:       Review of Systems   Eyes:        Eye droop    Neurological: Positive for facial asymmetry, speech difficulty and headaches.   All other systems reviewed and are negative.    Physical Exam     Patient Vitals for the past 24 hrs:   BP Temp Temp src Pulse Resp SpO2 Weight   10/25/18 1510 - 98.1  F (36.7  C) Temporal - - - -   10/25/18 1454 124/74 - - - - 94 % -   10/25/18 1445 - - - - - 99 % -   10/25/18 1435 - - - - - 99 % -   10/25/18 1434 123/74 - - - - - -   10/25/18 1345 135/77 - - - - 96 % -   10/25/18 1330 135/75 - - - - 95 % -   10/25/18 1315 133/69 - - - - 95 % -   10/25/18 1300 133/75 - - - - 96 % -   10/25/18 1245 (!) 134/106 - - - - 99 % -   10/25/18 1228 - - - - - - 90.7 kg (199 lb 15.3 oz)   10/25/18 1149 149/87 - - 64 18 95 % -   10/25/18 1145 149/87 - - - - - -     Physical Exam  General: Patient is alert and cooperative.  HENT:  Right sided facial weakness, subtle upper lid and forehead involvement.  Eyes: EOMI. Normal conjunctiva.  Neck:  Normal range of motion and appearance.   Cardiovascular:  Normal rate, regular rhythm and normal heart sounds.   Pulmonary/Chest:  Effort normal. No wheezing or crackles.  Abdominal: Soft. No distension or tenderness.     Musculoskeletal: Normal range of motion. No edema or tenderness.   Neurological: oriented and lucid.  Right facial weakness, otherwise normal CN, no focal weakness, normal coordination.   Skin: Warm and dry. No rash or bruising.   Psychiatric: Normal mood and affect. Normal behavior and judgement.    Emergency Department Course     ECG:  ECG taken at 1248, ECG read at 1256  Normal sinus rhythm  Normal ECG  Rate 61 bpm. MN interval 162 ms. QRS duration 86 ms. QT/QTc 430/432 ms. P-R-T axes 56 12 13.    Imaging:  Radiology findings were communicated with the patient who voiced understanding of the findings.    CT Head Perfusion w Contrast  Normal CT brain perfusion  imaging.  DENNIS HOPKINS MD  Reading per radiology    MR Brain w/o & w Contrast  1. No evidence for intracranial hemorrhage, acute infarct, or any  focal mass lesions.  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  3. Scattered nonspecific white matter changes which are not associated  with any mass effect.  4. Mild cerebral atrophy.  DENNIS HOPKINS MD  Reading per radiology    CTA Head Neck with Contrast  1. Atherosclerotic disease involving both carotid bifurcations with  50% stenosis of the left internal carotid artery.  2. No evidence for significant stenosis, dissection, thromboembolism,  or aneurysm.  DENNIS HOPKINS MD  Reading per radiology    CT Head w/o Contrast  Negative CT scan of the head.  DENNIS HOPKINS MD  Reading per radiology    Laboratory:  Laboratory findings were communicated with the patient who voiced understanding of the findings.    Creatinine POCT: WNL  Glucose by meter: 115 (H)  CBC: WBC 7.3, HGB 15.2,   BMP: Glucose 105 (H), GFR 52 (L) o/w WNL (Creatinine 1.03)  INR: 0.95  Partial Thromboplastin Time: 28  Troponin (Collected 1147): <0.015    Interventions:  1159 NS 80 mL IV  1159 ISOVUE-370 120 mL IV  1400 Gadavist 9 mL IV    Emergency Department Course:    1141 I performed an exam of the patient as documented above.     1149 Nursing notes and vitals reviewed.    1145 IV was inserted and blood was drawn for laboratory testing, results above.    1149 I spoke with Dr. Ramirez of the Neurology service from [facility] regarding patient's presentation, findings, and plan of care.    1152 IV was inserted and blood was drawn for laboratory testing, results above.    1158 The patient was sent for a CT while in the emergency department, results above.     1228 I spoke with Dr. Ramirez of the Neurology service from [facility] regarding patient's presentation, findings, and plan of care. Dr. Ramirez recommended getting an MRI performed.     1248 EKG obtained as noted above.    1401 The  patient was sent for a MRI while in the emergency department, results above.     1445 I personally reviewed the imaging, EKG, and lab results with the patient and answered all related questions prior to discharge.    Impression & Plan      Medical Decision Making:  Julieta Holm is a 77 year old female who presents to the emergency department today for evaluation of acute right sided facial weakness and numbness.  She also is complaining of an associated headache.  She does have a remote history of an ischemic CVA.  Clinically it was suspected that there was some associated weakness of her right forehead muscles which would suggest a peripheral nerve palsy such as Bell's palsy.  However given her age associated headache and prior history of stroke neuroimaging was undertaken and a stroke neurology consultation obtained.  Those results are ultimately showed no acute stroke or other central process.  She is being treated for Bell's palsy with a course of Valtrex and prednisone and it was recommended that she use artificial tears and gently taper her eye closed at night to protect it.  She should follow-up with neurology for recheck in the next couple weeks.    Diagnosis:    ICD-10-CM    1. Bell's palsy G51.0      Disposition:   The patient is discharged to home.           ED Stroke specific documentation           NIHSS PDF          Protocol PDF     Patient last known well time: 0800  ED Provider first to bedside at: 1141  CT Results received at: 1158  Patient was not treated with TPA due to the following reason(s):  Negative imaging, alternative diagnosis of Bell's Palsy    National Institutes of Health Stroke Scale (Baseline)  Time Performed: 0800      Score    Level of consciousness: (0)   Alert, keenly responsive    LOC questions: (0)   Answers both questions correctly    LOC commands: (0)   Performs both tasks correctly    Best gaze: (0)   Normal    Visual: (0)   No visual loss    Facial palsy: (1)   Right  sided weakness    Motor arm (left): (0)   No drift    Motor arm (right): (0)   No drift    Motor leg (left): (0)   No drift    Motor leg (right): (0)   No drift    Limb ataxia: (0)   Absent    Sensory: (0)   Normal- no sensory loss    Best language: (0)   Normal- no aphasia    Dysarthria: (0)   Normal    Extinction and inattention: (0)   No abnormality        Total Score:  1      Discharge Medications:  Discharge Medication List as of 10/25/2018  2:51 PM      START taking these medications    Details   polyvinyl alcohol (LIQUIFILM TEARS) 1.4 % ophthalmic solution Place 1 drop into the right eye as needed for dry eyes, Disp-15 mL, R-0, Local Print      predniSONE (DELTASONE) 20 MG tablet Take 3 tablets (60 mg) by mouth daily for 7 days, Disp-21 tablet, R-0, Local Print      valACYclovir (VALTREX) 1000 mg tablet Take 1 tablet (1,000 mg) by mouth 3 times daily for 7 days, Disp-21 tablet, R-0, Local Print           Scribe Disclosure:  I, Orla Severson, am serving as a scribe at 11:44 AM on 10/25/2018 to document services personally performed by Raji Phelps MD based on my observations and the provider's statements to me.    [facility] EMERGENCY DEPARTMENT       Raji Phelps MD  10/26/18 [id number]

## 2019-03-26 ENCOUNTER — TELEPHONE (OUTPATIENT)
Dept: OTOLARYNGOLOGY | Facility: CLINIC | Age: 79
End: 2019-03-26

## 2019-03-26 NOTE — TELEPHONE ENCOUNTER
I spoke with Mariya at [facility] ([phone]) I asked her to refax the referral to Fax: [phone].  She agreed.     TATI Lyman LPN

## 2019-03-26 NOTE — TELEPHONE ENCOUNTER
[facility] Call Center    Phone Message    May a detailed message be left on voicemail: yes    Reason for Call: Other: Stephanie with [facility] called and stated that she has faxed over a referral 3 times on 1/11/19, 1/18/19, and 3/12/19. Stephanie and pt have yet to hear from our clinic for scheduling. Please follow up with pt when available.     Action Taken: Message routed to:  [facility] ([facility] ENT

## 2020-02-16 ENCOUNTER — HEALTH MAINTENANCE LETTER (OUTPATIENT)
Age: 80
End: 2020-02-16

## 2020-03-15 ENCOUNTER — APPOINTMENT (OUTPATIENT)
Dept: CT IMAGING | Facility: CLINIC | Age: 80
End: 2020-03-15
Attending: EMERGENCY MEDICINE
Payer: COMMERCIAL

## 2020-03-15 ENCOUNTER — APPOINTMENT (OUTPATIENT)
Dept: MRI IMAGING | Facility: CLINIC | Age: 80
End: 2020-03-15
Attending: EMERGENCY MEDICINE
Payer: COMMERCIAL

## 2020-03-15 ENCOUNTER — HOSPITAL ENCOUNTER (EMERGENCY)
Facility: CLINIC | Age: 80
Discharge: HOME | End: 2020-03-15
Attending: EMERGENCY MEDICINE | Admitting: EMERGENCY MEDICINE
Payer: COMMERCIAL

## 2020-03-15 VITALS
HEART RATE: 122 BPM | WEIGHT: 199.96 LBS | SYSTOLIC BLOOD PRESSURE: 136 MMHG | DIASTOLIC BLOOD PRESSURE: 76 MMHG | BODY MASS INDEX: 34.32 KG/M2 | OXYGEN SATURATION: 95 % | TEMPERATURE: 98.3 F | RESPIRATION RATE: 16 BRPM

## 2020-03-15 DIAGNOSIS — H81.10 BENIGN PAROXYSMAL POSITIONAL VERTIGO, UNSPECIFIED LATERALITY: ICD-10-CM

## 2020-03-15 LAB
ANION GAP SERPL CALCULATED.3IONS-SCNC: 5 MMOL/L (ref 3–14)
BASOPHILS # BLD AUTO: 0 10E9/L (ref 0–0.2)
BASOPHILS NFR BLD AUTO: 0.3 %
BUN SERPL-MCNC: 19 MG/DL (ref 7–30)
CALCIUM SERPL-MCNC: 9.1 MG/DL (ref 8.5–10.1)
CHLORIDE SERPL-SCNC: 108 MMOL/L (ref 94–109)
CO2 SERPL-SCNC: 25 MMOL/L (ref 20–32)
CREAT SERPL-MCNC: 0.88 MG/DL (ref 0.52–1.04)
DIFFERENTIAL METHOD BLD: NORMAL
EOSINOPHIL # BLD AUTO: 0.1 10E9/L (ref 0–0.7)
EOSINOPHIL NFR BLD AUTO: 2.1 %
ERYTHROCYTE [DISTWIDTH] IN BLOOD BY AUTOMATED COUNT: 12.9 % (ref 10–15)
GFR SERPL CREATININE-BSD FRML MDRD: 63 ML/MIN/{1.73_M2}
GLUCOSE SERPL-MCNC: 105 MG/DL (ref 70–99)
HCT VFR BLD AUTO: 45.2 % (ref 35–47)
HGB BLD-MCNC: 14.7 G/DL (ref 11.7–15.7)
IMM GRANULOCYTES # BLD: 0 10E9/L (ref 0–0.4)
IMM GRANULOCYTES NFR BLD: 0.3 %
LYMPHOCYTES # BLD AUTO: 1.5 10E9/L (ref 0.8–5.3)
LYMPHOCYTES NFR BLD AUTO: 24 %
MCH RBC QN AUTO: 31.8 PG (ref 26.5–33)
MCHC RBC AUTO-ENTMCNC: 32.5 G/DL (ref 31.5–36.5)
MCV RBC AUTO: 98 FL (ref 78–100)
MONOCYTES # BLD AUTO: 0.5 10E9/L (ref 0–1.3)
MONOCYTES NFR BLD AUTO: 8.6 %
NEUTROPHILS # BLD AUTO: 4.1 10E9/L (ref 1.6–8.3)
NEUTROPHILS NFR BLD AUTO: 64.7 %
NRBC # BLD AUTO: 0 10*3/UL
NRBC BLD AUTO-RTO: 0 /100
PLATELET # BLD AUTO: 262 10E9/L (ref 150–450)
POTASSIUM SERPL-SCNC: 3.7 MMOL/L (ref 3.4–5.3)
RBC # BLD AUTO: 4.62 10E12/L (ref 3.8–5.2)
SODIUM SERPL-SCNC: 138 MMOL/L (ref 133–144)
TROPONIN I SERPL-MCNC: <0.015 UG/L (ref 0–0.04)
WBC # BLD AUTO: 6.3 10E9/L (ref 4–11)

## 2020-03-15 PROCEDURE — 70553 MRI BRAIN STEM W/O & W/DYE: CPT

## 2020-03-15 PROCEDURE — 25500064 ZZH RX 255 OP 636: Performed by: EMERGENCY MEDICINE

## 2020-03-15 PROCEDURE — A9585 GADOBUTROL INJECTION: HCPCS | Performed by: EMERGENCY MEDICINE

## 2020-03-15 PROCEDURE — 84484 ASSAY OF TROPONIN QUANT: CPT | Performed by: EMERGENCY MEDICINE

## 2020-03-15 PROCEDURE — 80048 BASIC METABOLIC PNL TOTAL CA: CPT | Performed by: EMERGENCY MEDICINE

## 2020-03-15 PROCEDURE — 85025 COMPLETE CBC W/AUTO DIFF WBC: CPT | Performed by: EMERGENCY MEDICINE

## 2020-03-15 PROCEDURE — 99285 EMERGENCY DEPT VISIT HI MDM: CPT | Mod: 25

## 2020-03-15 PROCEDURE — 25000125 ZZHC RX 250: Performed by: EMERGENCY MEDICINE

## 2020-03-15 PROCEDURE — 96360 HYDRATION IV INFUSION INIT: CPT | Mod: 59

## 2020-03-15 PROCEDURE — 70498 CT ANGIOGRAPHY NECK: CPT

## 2020-03-15 PROCEDURE — 70450 CT HEAD/BRAIN W/O DYE: CPT

## 2020-03-15 PROCEDURE — 25000128 H RX IP 250 OP 636: Performed by: EMERGENCY MEDICINE

## 2020-03-15 PROCEDURE — 25800030 ZZH RX IP 258 OP 636: Performed by: EMERGENCY MEDICINE

## 2020-03-15 PROCEDURE — 93005 ELECTROCARDIOGRAM TRACING: CPT

## 2020-03-15 PROCEDURE — 25000132 ZZH RX MED GY IP 250 OP 250 PS 637: Performed by: EMERGENCY MEDICINE

## 2020-03-15 RX ORDER — GADOBUTROL 604.72 MG/ML
7.5 INJECTION INTRAVENOUS ONCE
Status: COMPLETED | OUTPATIENT
Start: 2020-03-15 | End: 2020-03-15

## 2020-03-15 RX ORDER — IOPAMIDOL 755 MG/ML
500 INJECTION, SOLUTION INTRAVASCULAR ONCE
Status: COMPLETED | OUTPATIENT
Start: 2020-03-15 | End: 2020-03-15

## 2020-03-15 RX ORDER — MECLIZINE HYDROCHLORIDE 25 MG/1
25 TABLET ORAL ONCE
Status: COMPLETED | OUTPATIENT
Start: 2020-03-15 | End: 2020-03-15

## 2020-03-15 RX ORDER — MECLIZINE HYDROCHLORIDE 25 MG/1
25 TABLET ORAL 3 TIMES DAILY PRN
Qty: 21 TABLET | Refills: 0 | Status: SHIPPED | OUTPATIENT
Start: 2020-03-15 | End: 2020-03-15

## 2020-03-15 RX ORDER — MECLIZINE HYDROCHLORIDE 25 MG/1
25 TABLET ORAL 3 TIMES DAILY PRN
Qty: 21 TABLET | Refills: 0 | Status: SHIPPED | OUTPATIENT
Start: 2020-03-15

## 2020-03-15 RX ADMIN — SODIUM CHLORIDE 500 ML: 9 INJECTION, SOLUTION INTRAVENOUS at 13:13

## 2020-03-15 RX ADMIN — GADOBUTROL 7.5 ML: 604.72 INJECTION INTRAVENOUS at 16:08

## 2020-03-15 RX ADMIN — IOPAMIDOL 70 ML: 755 INJECTION, SOLUTION INTRAVENOUS at 14:44

## 2020-03-15 RX ADMIN — SODIUM CHLORIDE 80 ML: 9 INJECTION, SOLUTION INTRAVENOUS at 14:44

## 2020-03-15 RX ADMIN — MECLIZINE HYDROCHLORIDE 25 MG: 25 TABLET ORAL at 13:12

## 2020-03-15 ASSESSMENT — ENCOUNTER SYMPTOMS
PALPITATIONS: 0
NUMBNESS: 0
LIGHT-HEADEDNESS: 1
SHORTNESS OF BREATH: 0
WEAKNESS: 0
DIZZINESS: 1
HEADACHES: 0
APPETITE CHANGE: 0

## 2020-03-15 NOTE — ED AVS SNAPSHOT
[facility] Emergency Department  [address]  [facility] [address]  Phone:  [phone]  Fax:  [phone]                                    Julieta Holm   MRN: [id number]    Department:  [facility] Emergency Department   Date of Visit:  3/15/2020           After Visit Summary Signature Page    I have received my discharge instructions, and my questions have been answered. I have discussed any challenges I see with this plan with the nurse or doctor.    ..........................................................................................................................................  Patient/Patient Representative Signature      ..........................................................................................................................................  Patient Representative Print Name and Relationship to Patient    ..................................................               ................................................  Date                                   Time    ..........................................................................................................................................  Reviewed by Signature/Title    ...................................................              ..............................................  Date                                               Time          22EPIC Rev 08/18

## 2020-03-15 NOTE — ED TRIAGE NOTES
Pt c/o syncope x3 in the last 24 hour. Pt states she will get lightheadedand dizzy when its going to happen.  denies injury for falls

## 2020-03-15 NOTE — DISCHARGE INSTRUCTIONS
Try to avoid any sudden changes position..  Be very careful about bending over and sudden head movements.  Take special fall precautions to try to prevent injury.  If develop severe headache vision disturbance with persistent or uncontrolled dizziness fever true fainting episode chest pain shortness of breath return to the emergency department.

## 2020-03-15 NOTE — ED PROVIDER NOTES
History     Chief Complaint:  Near-syncope     HPI   Julieta Holm is a 79 year old female with history of stroke and vertebral artery stenosis on Plavix who presents with her  for near-syncope. The patient first felt dizzy and near syncopal when bending over brushing her teeth yesterday morning and again when rolling over in bed last night. She denies headache, chest pain, or palpitations before these episodes. Today, when bending to get something out of her fridge, her symptoms returned, causing her to slide to the ground. She did not totally lose consciousness or sustain any injury. The patient notes similar symptoms with her stroke about 7 years ago. She denies room-spinning, numbness, tingling, weakness, shortness of breath, chest pain, decreased appetite, or vision changes.     Allergies:  Lisinopril  Nabumetone     Medications:    amlodipine   aspirin 81 mg  atorvastatin   Plavix  losartan-hydrochlorothiazide  Valtrex    Past Medical History:    History of cerebellar stroke  Chronic constipation  CKD, stage 3  Hypertension  Hyperlipidemia  KELLY  History of rheumatic fever  Vertebral artery stenosis  osteopenia     Past Surgical History:    Hysterectomy  Bilateral cataract extraction  Colonoscopy  D&Cs  cholecystectomy  tonsillectomy     Family History:    Father - coronary artery disease  Mother - diabetes, peripheral vascular disease, hypertension  Son - hyperlipidemia      Social History:  The patient was accompanied to the ED by her .  Tobacco use: negative   Alcohol use: a few drinks yearly  PCP: Conor Arenas     Review of Systems   Constitutional: Negative for appetite change.   Eyes: Negative for visual disturbance.   Respiratory: Negative for shortness of breath.    Cardiovascular: Negative for chest pain and palpitations.   Neurological: Positive for dizziness and light-headedness. Negative for syncope, weakness, numbness and headaches.   All other systems reviewed and are  negative.        Physical Exam     Patient Vitals for the past 24 hrs:   BP Temp Pulse Heart Rate Resp SpO2 Weight   03/15/20 1500 (!) 151/82 -- 61 61 20 96 % --   03/15/20 1415 134/71 -- 55 56 22 98 % --   03/15/20 1325 -- -- -- 55 20 97 % --   03/15/20 1315 121/68 -- 58 58 18 96 % --   03/15/20 1253 (!) 155/61 98.3  F (36.8  C) 63 63 16 98 % 90.7 kg (199 lb 15.3 oz)        Physical Exam  Vital signs and nursing notes reviewed.     Constitutional: laying on gurney appears comfortable  HENT: Oropharynx is clear and moist  Eyes: Conjunctivae are normal bilaterally. Pupils equal round and reactive.  No nystagmus noted.  Neck: normal range of motion, no carotid bruits  Cardiovascular: Normal rate, regular rhythm, normal heart sounds.  Murmurs  Pulmonary/Chest: Effort normal and breath sounds normal. No respiratory distress.   Abdominal: Soft. Bowel sounds are normal. No tenderness to palpation. No rebound or guarding.   Musculoskeletal: No joint swelling or edema.   Neurological: Alert and oriented. No focal weakness in upper or lower extremities.  Patient has residual right facial motor weakness due to chronic Bell's palsy.  Confusion or disorientation.  Skin: Skin is warm and dry. No rash noted.   Psych: normal affect    Emergency Department Course     ECG:  Indication: near syncope  Time: 1301  Vent. Rate 63 bpm. NJ interval 154. QRS duration 80. QT/QTc 414/423. P-R-T axis 60 19 20.  Normal sinus rhyhm. Normal ECG. Read time: 1305     Imaging:  Radiology findings were communicated with the patient who voiced understanding of the findings.    CTA head neck w/ IV contrast:   1. No high-grade stenosis is seen at either carotid bifurcation. The   stenosis at both carotid bifurcations appears to be less than 50%.   2. No arterial dissection is identified.   3. No high-grade intracranial vascular stenosis is identified. No   high-grade intracranial vascular stenosis.   4. Venous sinuses appear patent, as per radiology.      CT head w/o IV contrast:   No acute pathology. No bleed, mass, or areas of acute   Infarction, as per radiology.     Laboratory:  Laboratory findings were communicated with the patient and family who voiced understanding of the findings.    CBC: WBC 6.3, HGB 14.7,    BMP: glucose 105 (H) o/w WNL (Creatinine 0.88)    1340 Troponin: <0.015     Interventions:  1312 Meclizine 25 mg PO  1313  mL NS     Emergency Department Course:  Past medical records, nursing notes, and vitals reviewed.    1258 I performed an exam of the patient as documented above.     EKG obtained in the ED, see results above.   IV was inserted and blood was drawn for laboratory testing, results above.  The patient was sent for a head CT and head/ neck CTA while in the emergency department, results above.     Patient rechecked and updated.      Initial results discussed with patient.  Patient elects to have MRI of brain for further evaluation to ensure no small posterior ischemic stroke.  Patient states she wants to be discharged if MR is negative.    Impression & Plan     Medical Decision Making:  Julieta Holm is a 79 year old female who presents to the emergency department today after having episodes of what sounds like either near-syncopal events versus even brief positional vertigo.  On arrival, patient has no acute neurologic findings. She has normal vital signs. EKG is unremarkable. Our lab tests show no concerning findings. CT scan and CTA of the head and neck were obtained which do not show any obvious acute findings. She was given a dose of meclizine here in the Emergency Department. I discussed with her the findings on the imaging studies. There is no clear indication patient had an acute stroke. Symptomatic complaints seem most consistent with transient positional vertigo events, but I cannot exclude near syncopal events..  I reviewed all the studies up to this point with the patient.  I discussed other options for  testing.  She elects to have an MRI of the brain to ensure there is no findings of a new posterior cerebellar stroke.  Patient states that if the MRI is negative she would elect to be discharged home.  The plan is if she is discharged will be to have her take meclizine 3 times daily and avoid sudden changes position and careful and avoid falls and take special caution.  She would then follow-up with her primary care physician as an outpatient.  Also ordered a outpatient audiology balance clinic referral if symptoms persist over the next couple days.  Discussed case my partner Dr. Ramirez will follow-up on MRI results and discussed these with patient and provide final disposition.    Discharge Diagnosis:    ICD-10-CM   1. Benign paroxysmal positional vertigo, unspecified laterality  H81.10       Disposition:  Disposition pending    Scribe Disclosure:  I, Odessa Estrella, am serving as a scribe at 12:58 PM on 3/15/2020 to document services personally performed by Trace Salas MD based on my observations and the provider's statements to me.       Trace Salas MD  03/17/20 1054

## 2020-03-15 NOTE — ED PROVIDER NOTES
I received signout from Dr. Salas.  Please see his H&P for full specifics.  Briefly the patient presented with dizziness.  I was asked to follow-up on MRI results.    On my exam,     Patient Vitals for the past 24 hrs:   BP Temp Pulse Heart Rate Resp SpO2 Weight   03/15/20 1725 130/78 -- 66 71 18 96 % --   03/15/20 1545 129/75 -- 61 -- 18 97 % --   03/15/20 1500 (!) 151/82 -- 61 61 20 96 % --   03/15/20 1415 134/71 -- 55 56 22 98 % --   03/15/20 1325 -- -- -- 55 20 97 % --   03/15/20 1315 121/68 -- 58 58 18 96 % --   03/15/20 1253 (!) 155/61 98.3  F (36.8  C) 63 63 16 98 % 90.7 kg (199 lb 15.3 oz)     Constitutional: Vital signs reviewed as above.   HENT:    Head: No external signs of trauma noted.   Eyes: Pupils are equal, round, and reactive to light.   Neck: No carotid bruit noted  Cardiovascular: Normal rate, regular rhythm, normal heart sounds and intact distal pulses.    Pulmonary/Chest: Effort normal and breath sounds normal. No respiratory distress. No wheezes noted.   Gastrointestinal: Soft. There is no tenderness. There is no rebound.   Musculoskeletal:   No deformities appreciated   No edema noted  Neurological:    Patient is alert and oriented to person, place, and time.    Speech is fluent, cognition is normal.   CN 2-12 notable for a mild right sided facial weakness (smile asymmetry, difficulty puffing out cheek, right eyebrow weakness) - patient states this is chronic from her [place] Palsy in October 2018.    Otherwise PERRL, EOMI, equal eye squeeze, normal and equal sensation to bilateral forehead/cheek/chin, equal hearing to finger rub, midline tongue protrusion with nl side-to-side movement, normal shoulder shrug).    RUE strength 5/5: , finger abd, wrist flex/ext, elbow flex/ext.    LUE strength 5/5: , finger abd, wrist flex/ext, elbow flex/ext.    RLE strength 5/5: ankle flex/ext, knee flex/ext, hip flex.    LLE strength 5/5: ankle flex/ext, knee flex/ext, hip flex.    Sensation equal  in all 4 extremities.    No arm drift.     Cerebellar: Normal rapid alternating movements     ( finger-nose-finger, rapid pronation/supination, hand rolling)    Normal heel-to-shin  Skin: Skin is warm and dry.   Psychiatric: The patient appears calm        Results:    MR Brain w/o & w Contrast   Final Result   IMPRESSION:     1. No acute pathology, no bleed, mass, or acute infarcts.   2. Small chronic infarct in the left cerebellum.   3. There is diffuse parenchymal volume loss.  White matter changes are   present in the cerebral hemispheres that are consistent with small   vessel ischemic disease in this age patient..         PREET CAMPBELL MD      CTA Head Neck with Contrast   Final Result   IMPRESSION:    1. No high-grade stenosis is seen at either carotid bifurcation. The   stenosis at the right carotid bifurcation was calculated at 56%.   2. No arterial dissection is identified.   3. There is extensive calcified atherosclerotic plaque in the right   and left carotid siphons. The atherosclerotic plaque is causing a   moderate-severe stenosis in the distal right carotid siphon. This is   also present on the CT angiogram of 10/25/2018.   4. Venous sinuses appear patent      Findings discussed with Dr. Ramirez.            PREET CAMPBELL MD      CT Head w/o Contrast   Final Result   IMPRESSION: No acute pathology. No bleed, mass, or areas of acute   infarction.         PREET CAMPBELL MD          Labs Ordered and Resulted from Time of ED Arrival Up to the Time of Departure from the ED   BASIC METABOLIC PANEL - Abnormal; Notable for the following components:       Result Value    Glucose 105 (*)     All other components within normal limits   CBC WITH PLATELETS DIFFERENTIAL   TROPONIN I   IV ACCESS   ORTHOSTATIC BLOOD PRESSURE AND PULSE       ED course:    Medications   meclizine (ANTIVERT) tablet 25 mg (25 mg Oral Given 3/15/20 1312)   0.9% sodium chloride BOLUS (0 mLs Intravenous Stopped 3/15/20 1433)   iopamidol (ISOVUE-370)  solution 500 mL (70 mLs Intravenous Given 3/15/20 1444)   Saline Flush (80 mLs Other Given 3/15/20 1444)   gadobutrol (GADAVIST) injection 7.5 mL (7.5 mLs Intravenous Given 3/15/20 1608)       ED Course as of Mar 15 1813   Sun Mar 15, 2020   1621 D/W Dr. Chaudhari (neuroradiology).  The reformatted images for the CT angios of the head of the neck came back and it does appear there is extensive atherosclerotic plaque in the carotid siphons and high-grade stenosis on the right side specifically.  In looking at old images, Dr. Chaudhari notes that this probably was present in 2018.      1652 D/W Dr. Khan. If MRI ok, can still likely DC if orthostatics normal.      1655 Dr. Ramirez evaluation of the patient.      1736 Dr. Ramirez re-evaluation and neuro exam. Patient and  note chronic changes in her facial exam due to [place] Palsy in October 2018. Patient ok for discharge. As our pharmacy is not open, will send scripts to the 24 hour [facility] in [place] per patient request.          Impression:    ICD-10-CM    1. Benign paroxysmal positional vertigo, unspecified laterality  H81.10 AUDIOLOGY BALANCE REFERRAL         Ty Ramirez DO Burns, Bradley Joseph, DO  03/15/20 1813

## 2020-03-16 LAB — INTERPRETATION ECG - MUSE: NORMAL

## 2024-08-19 ENCOUNTER — HOSPITAL ENCOUNTER (EMERGENCY)
Facility: CLINIC | Age: 84
Discharge: HOME | End: 2024-08-19
Attending: STUDENT IN AN ORGANIZED HEALTH CARE EDUCATION/TRAINING PROGRAM | Admitting: STUDENT IN AN ORGANIZED HEALTH CARE EDUCATION/TRAINING PROGRAM
Payer: COMMERCIAL

## 2024-08-19 VITALS
RESPIRATION RATE: 18 BRPM | HEART RATE: 59 BPM | SYSTOLIC BLOOD PRESSURE: 126 MMHG | BODY MASS INDEX: 31.07 KG/M2 | WEIGHT: 182 LBS | OXYGEN SATURATION: 97 % | DIASTOLIC BLOOD PRESSURE: 73 MMHG | TEMPERATURE: 97.9 F | HEIGHT: 64 IN

## 2024-08-19 DIAGNOSIS — Z01.30 BLOOD PRESSURE CHECK: ICD-10-CM

## 2024-08-19 DIAGNOSIS — N39.0 UTI (URINARY TRACT INFECTION): ICD-10-CM

## 2024-08-19 DIAGNOSIS — R42 LIGHTHEADEDNESS: ICD-10-CM

## 2024-08-19 LAB
ALBUMIN UR-MCNC: NEGATIVE MG/DL
ANION GAP SERPL CALCULATED.3IONS-SCNC: 14 MMOL/L (ref 7–15)
APPEARANCE UR: CLEAR
ATRIAL RATE - MUSE: 57 BPM
BACTERIA #/AREA URNS HPF: ABNORMAL /HPF
BASOPHILS # BLD AUTO: 0 10E3/UL (ref 0–0.2)
BASOPHILS NFR BLD AUTO: 1 %
BILIRUB UR QL STRIP: NEGATIVE
BUN SERPL-MCNC: 16.4 MG/DL (ref 8–23)
CALCIUM SERPL-MCNC: 9.4 MG/DL (ref 8.8–10.4)
CHLORIDE SERPL-SCNC: 101 MMOL/L (ref 98–107)
COLOR UR AUTO: ABNORMAL
CREAT SERPL-MCNC: 1.07 MG/DL (ref 0.51–0.95)
DIASTOLIC BLOOD PRESSURE - MUSE: NORMAL MMHG
EGFRCR SERPLBLD CKD-EPI 2021: 52 ML/MIN/1.73M2
EOSINOPHIL # BLD AUTO: 0.3 10E3/UL (ref 0–0.7)
EOSINOPHIL NFR BLD AUTO: 4 %
ERYTHROCYTE [DISTWIDTH] IN BLOOD BY AUTOMATED COUNT: 12.5 % (ref 10–15)
GLUCOSE SERPL-MCNC: 106 MG/DL (ref 70–99)
GLUCOSE UR STRIP-MCNC: NEGATIVE MG/DL
HCO3 SERPL-SCNC: 21 MMOL/L (ref 22–29)
HCT VFR BLD AUTO: 38.7 % (ref 35–47)
HGB BLD-MCNC: 12.9 G/DL (ref 11.7–15.7)
HGB UR QL STRIP: NEGATIVE
IMM GRANULOCYTES # BLD: 0 10E3/UL
IMM GRANULOCYTES NFR BLD: 0 %
INTERPRETATION ECG - MUSE: NORMAL
KETONES UR STRIP-MCNC: NEGATIVE MG/DL
LEUKOCYTE ESTERASE UR QL STRIP: ABNORMAL
LYMPHOCYTES # BLD AUTO: 1.2 10E3/UL (ref 0.8–5.3)
LYMPHOCYTES NFR BLD AUTO: 18 %
MCH RBC QN AUTO: 32.3 PG (ref 26.5–33)
MCHC RBC AUTO-ENTMCNC: 33.3 G/DL (ref 31.5–36.5)
MCV RBC AUTO: 97 FL (ref 78–100)
MONOCYTES # BLD AUTO: 0.5 10E3/UL (ref 0–1.3)
MONOCYTES NFR BLD AUTO: 8 %
NEUTROPHILS # BLD AUTO: 4.5 10E3/UL (ref 1.6–8.3)
NEUTROPHILS NFR BLD AUTO: 69 %
NITRATE UR QL: NEGATIVE
NRBC # BLD AUTO: 0 10E3/UL
NRBC BLD AUTO-RTO: 0 /100
P AXIS - MUSE: 46 DEGREES
PH UR STRIP: 6.5 [PH] (ref 5–7)
PLATELET # BLD AUTO: 224 10E3/UL (ref 150–450)
POTASSIUM SERPL-SCNC: 3.9 MMOL/L (ref 3.4–5.3)
PR INTERVAL - MUSE: 178 MS
QRS DURATION - MUSE: 76 MS
QT - MUSE: 426 MS
QTC - MUSE: 414 MS
R AXIS - MUSE: 15 DEGREES
RBC # BLD AUTO: 3.99 10E6/UL (ref 3.8–5.2)
RBC URINE: 1 /HPF
SODIUM SERPL-SCNC: 136 MMOL/L (ref 135–145)
SP GR UR STRIP: 1.01 (ref 1–1.03)
SQUAMOUS EPITHELIAL: 1 /HPF
SYSTOLIC BLOOD PRESSURE - MUSE: NORMAL MMHG
T AXIS - MUSE: 29 DEGREES
TRANSITIONAL EPI: 1 /HPF
TROPONIN T SERPL HS-MCNC: 8 NG/L
TROPONIN T SERPL HS-MCNC: 8 NG/L
UROBILINOGEN UR STRIP-MCNC: NORMAL MG/DL
VENTRICULAR RATE- MUSE: 57 BPM
WBC # BLD AUTO: 6.5 10E3/UL (ref 4–11)
WBC URINE: 8 /HPF

## 2024-08-19 PROCEDURE — 84484 ASSAY OF TROPONIN QUANT: CPT | Performed by: STUDENT IN AN ORGANIZED HEALTH CARE EDUCATION/TRAINING PROGRAM

## 2024-08-19 PROCEDURE — 81001 URINALYSIS AUTO W/SCOPE: CPT | Performed by: STUDENT IN AN ORGANIZED HEALTH CARE EDUCATION/TRAINING PROGRAM

## 2024-08-19 PROCEDURE — 96365 THER/PROPH/DIAG IV INF INIT: CPT | Performed by: STUDENT IN AN ORGANIZED HEALTH CARE EDUCATION/TRAINING PROGRAM

## 2024-08-19 PROCEDURE — 36415 COLL VENOUS BLD VENIPUNCTURE: CPT | Performed by: STUDENT IN AN ORGANIZED HEALTH CARE EDUCATION/TRAINING PROGRAM

## 2024-08-19 PROCEDURE — 99284 EMERGENCY DEPT VISIT MOD MDM: CPT | Mod: 25 | Performed by: STUDENT IN AN ORGANIZED HEALTH CARE EDUCATION/TRAINING PROGRAM

## 2024-08-19 PROCEDURE — 87086 URINE CULTURE/COLONY COUNT: CPT | Performed by: STUDENT IN AN ORGANIZED HEALTH CARE EDUCATION/TRAINING PROGRAM

## 2024-08-19 PROCEDURE — 250N000011 HC RX IP 250 OP 636: Performed by: STUDENT IN AN ORGANIZED HEALTH CARE EDUCATION/TRAINING PROGRAM

## 2024-08-19 PROCEDURE — 80048 BASIC METABOLIC PNL TOTAL CA: CPT | Performed by: STUDENT IN AN ORGANIZED HEALTH CARE EDUCATION/TRAINING PROGRAM

## 2024-08-19 PROCEDURE — 85025 COMPLETE CBC W/AUTO DIFF WBC: CPT | Performed by: STUDENT IN AN ORGANIZED HEALTH CARE EDUCATION/TRAINING PROGRAM

## 2024-08-19 PROCEDURE — 93005 ELECTROCARDIOGRAM TRACING: CPT | Performed by: STUDENT IN AN ORGANIZED HEALTH CARE EDUCATION/TRAINING PROGRAM

## 2024-08-19 RX ORDER — CEFTRIAXONE 1 G/1
1 INJECTION, POWDER, FOR SOLUTION INTRAMUSCULAR; INTRAVENOUS ONCE
Status: COMPLETED | OUTPATIENT
Start: 2024-08-19 | End: 2024-08-19

## 2024-08-19 RX ORDER — CEPHALEXIN 500 MG/1
500 CAPSULE ORAL 2 TIMES DAILY
Qty: 10 CAPSULE | Refills: 0 | Status: SHIPPED | OUTPATIENT
Start: 2024-08-19 | End: 2024-08-20

## 2024-08-19 RX ADMIN — CEFTRIAXONE 1 G: 1 INJECTION, POWDER, FOR SOLUTION INTRAMUSCULAR; INTRAVENOUS at 15:48

## 2024-08-19 ASSESSMENT — ACTIVITIES OF DAILY LIVING (ADL)
ADLS_ACUITY_SCORE: 35

## 2024-08-19 ASSESSMENT — COLUMBIA-SUICIDE SEVERITY RATING SCALE - C-SSRS
6. HAVE YOU EVER DONE ANYTHING, STARTED TO DO ANYTHING, OR PREPARED TO DO ANYTHING TO END YOUR LIFE?: NO
2. HAVE YOU ACTUALLY HAD ANY THOUGHTS OF KILLING YOURSELF IN THE PAST MONTH?: NO
1. IN THE PAST MONTH, HAVE YOU WISHED YOU WERE DEAD OR WISHED YOU COULD GO TO SLEEP AND NOT WAKE UP?: NO

## 2024-08-19 NOTE — ED TRIAGE NOTES
Pt arrives from home. She lives at home with  in senior living facility in [place]. Pt. Started having nose bleeds at the beginning of the month so she was worried about her blood pressures. She took her blood pressure this morning and she stated her systolic pressure was in the 70's and she was symptomatic with dizziness and lightheadedness. Pt. Is on thinners, not diabetic, A./Ox4     Triage Assessment (Adult)       Row Name 08/19/24 4024          Triage Assessment    Airway WDL WDL        Respiratory WDL    Respiratory WDL WDL        Skin Circulation/Temperature WDL    Skin Circulation/Temperature WDL WDL        Peripheral/Neurovascular WDL    Peripheral Neurovascular WDL WDL        Cognitive/Neuro/Behavioral WDL    Cognitive/Neuro/Behavioral WDL WDL

## 2024-08-19 NOTE — DISCHARGE INSTRUCTIONS
Antibiotics have been sent to pharmacy, please take as prescribed.  I also encourage you to follow-up with your primary care provider for reassessment after ER visit today.  I suspect that your blood pressure readings today may have not been the most accurate as your blood pressure here has been great.    For any worsening symptoms, please return to ER.

## 2024-08-19 NOTE — ED PROVIDER NOTES
Emergency Department Note      History of Present Illness     Chief Complaint   Dizziness      HPI   Julieta Holm is a 83 year old female with history of hyperlipidemia, hypertension, CVA, CKD, vertebral artery stenosis, who presents with hypotension.  Patient reports that she took her blood pressure this morning at around 11 AM when she noticed blood pressure to be low.  First blood pressure reading was 79/54, second blood pressure reading 80/50.  This was after she took her daily amlodipine.  She reports feeling lightheaded this morning but did not feel unsteady on her feet, nauseous, or as if she was going to faint.  She denies any chest pain, abdominal pain, vomiting.  Reports recent history of constipation however had significant stool output yesterday.  Denies any symptoms upon arriving to ER.  EMS reports that blood pressure was stable with them, 157/79 upon arrival to ER.    Independent Historian   None    Review of External Notes   Reviewed nurse triage note from earlier today due to concerns for hypotension    Past Medical History     Medical History and Problem List   Past Medical History:   Diagnosis Date    Cerebellar stroke, acute (H) 3/2013    Chronic constipation     CKD (chronic kidney disease) stage 3, GFR 30-59 ml/min (H) 4/2014    Essential hypertension, benign 2001    Mixed hyperlipidemia     KELLY (obstructive sleep apnea) 2013    Osteopenia 2015    Other chest pain 4/2003    Rheumatic fever without mention of heart involvement 1972    Vertebral artery stenosis 3/2013       Medications   cephALEXin (KEFLEX) 500 MG capsule  amLODIPine (NORVASC) 2.5 MG tablet  aspirin 81 MG tablet  atorvastatin (LIPITOR) 40 MG tablet  calcium 600 MG tablet  carboxymethylcellulose (CARBOXYMETHYLCELLULOSE SODIUM) 0.5 % SOLN ophthalmic solution  Cholecalciferol (VITAMIN D3 PO)  clopidogrel (PLAVIX) 75 MG tablet  FISH OIL 1000 MG OR CAPS  losartan-hydrochlorothiazide (HYZAAR) 50-12.5 MG per tablet  meclizine  "(ANTIVERT) 25 MG tablet  sodium chloride (FRANCOIS 128) 2 % ophthalmic solution  valACYclovir (VALTREX) 1000 mg tablet        Surgical History   Past Surgical History:   Procedure Laterality Date    C VAGINAL HYSTERECTOMY  1982    menometrorrhagia.  Had a laparoscpy prior to this procedure    CATARACT IOL, RT/LT  9/2008    bilateral    COLONOSCOPY  9/2001    patient told to repeat in 10 years    HC DILATION/CURETTAGE DIAG/THER NON OB      D&C's    HC LAPAROSCOPY, SURGICAL; CHOLECYSTECTOMY  2002    Cholecystectomy, Laparoscopic    HYSTERECTOMY, PAP NO LONGER INDICATED  1982    TONSILLECTOMY  1948       Physical Exam     Patient Vitals for the past 24 hrs:   BP Temp Temp src Pulse Resp SpO2 Height Weight   08/19/24 1629 -- -- -- -- -- 97 % -- --   08/19/24 1614 126/73 -- -- 59 -- 95 % -- --   08/19/24 1559 135/69 -- -- 58 -- 97 % -- --   08/19/24 1544 134/78 -- -- -- -- -- -- --   08/19/24 1514 (!) 145/73 -- -- 60 -- 96 % -- --   08/19/24 1434 (!) 142/76 -- -- -- -- -- -- --   08/19/24 1428 (!) 142/76 -- -- 61 -- 96 % -- --   08/19/24 1415 (!) 143/72 -- -- 60 -- 98 % -- --   08/19/24 1400 130/71 -- -- 58 -- 97 % -- --   08/19/24 1345 (!) 145/71 -- -- 59 -- 97 % -- --   08/19/24 1341 -- 97.9  F (36.6  C) Oral -- -- -- -- --   08/19/24 1340 -- -- -- -- -- -- 1.626 m (5' 4\") 82.6 kg (182 lb)   08/19/24 1336 (!) 157/79 -- -- 64 18 98 % -- --     Physical Exam  General: Alert and cooperative with exam. Patient in no apparent distress. Normal mentation.  Head:  Scalp is NC/AT  Eyes:  No scleral icterus, PERRL  ENT:  The external nose and ears are normal.   Neck:  Normal range of motion without rigidity.  CV:  Regular rate and rhythm    No pathologic murmur   Resp:  Breath sounds are clear bilaterally    Non-labored, no retractions or accessory muscle use  GI:  Abdomen is soft, no distension, no tenderness. No peritoneal signs  MS:  No lower extremity edema   Skin:  Warm and dry, No rash or lesions noted.  Neuro:  Oriented x 3. " No gross motor deficits.      Diagnostics     Lab Results   Labs Ordered and Resulted from Time of ED Arrival to Time of ED Departure   BASIC METABOLIC PANEL - Abnormal       Result Value    Sodium 136      Potassium 3.9      Chloride 101      Carbon Dioxide (CO2) 21 (*)     Anion Gap 14      Urea Nitrogen 16.4      Creatinine 1.07 (*)     GFR Estimate 52 (*)     Calcium 9.4      Glucose 106 (*)    ROUTINE UA WITH MICROSCOPIC REFLEX TO CULTURE - Abnormal    Color Urine Light Yellow      Appearance Urine Clear      Glucose Urine Negative      Bilirubin Urine Negative      Ketones Urine Negative      Specific Gravity Urine 1.008      Blood Urine Negative      pH Urine 6.5      Protein Albumin Urine Negative      Urobilinogen Urine Normal      Nitrite Urine Negative      Leukocyte Esterase Urine Large (*)     Bacteria Urine Few (*)     RBC Urine 1      WBC Urine 8 (*)     Squamous Epithelials Urine 1      Transitional Epithelials Urine 1     TROPONIN T, HIGH SENSITIVITY - Normal    Troponin T, High Sensitivity 8     TROPONIN T, HIGH SENSITIVITY - Normal    Troponin T, High Sensitivity 8     CBC WITH PLATELETS AND DIFFERENTIAL    WBC Count 6.5      RBC Count 3.99      Hemoglobin 12.9      Hematocrit 38.7      MCV 97      MCH 32.3      MCHC 33.3      RDW 12.5      Platelet Count 224      % Neutrophils 69      % Lymphocytes 18      % Monocytes 8      % Eosinophils 4      % Basophils 1      % Immature Granulocytes 0      NRBCs per 100 WBC 0      Absolute Neutrophils 4.5      Absolute Lymphocytes 1.2      Absolute Monocytes 0.5      Absolute Eosinophils 0.3      Absolute Basophils 0.0      Absolute Immature Granulocytes 0.0      Absolute NRBCs 0.0     URINE CULTURE       Imaging   No orders to display       ECG results from 08/19/24   EKG 12-lead, tracing only     Value    Systolic Blood Pressure     Diastolic Blood Pressure     Ventricular Rate 57    Atrial Rate 57    TN Interval 178    QRS Duration 76        QTc 414     P Axis 46    R AXIS 15    T Axis 29    Interpretation ECG      Sinus bradycardia  Low voltage QRS  Borderline ECG  When compared with ECG of 15-Mar-2020 13:01,  No significant change was found  Unconfirmed report - interpretation of this ECG is computer generated - see medical record for final interpretation  Confirmed by MD HERNANDO, LIZ ([id number]),  Waylon Thomas ([id number]) on 8/19/2024 2:17:29 PM           Independent Interpretation   None    ED Course      Medications Administered   Medications   cefTRIAXone (ROCEPHIN) 1 g vial to attach to  mL bag for ADULTS or NS 50 mL bag for PEDS (0 g Intravenous Stopped 8/19/24 2970)       Procedures   Procedures     Discussion of Management   None    ED Course        Additional Documentation  None    Medical Decision Making / Diagnosis     CMS Diagnoses: None    MIPS       None    MDM   Julieta Holm is a 83 year old female who presents with concern for hypotension and lightheadedness.  Reports low blood pressure readings at home.  See HPI for further details.  Upon arrival to ER, patient's blood pressure well-controlled without intervention.  Blood pressure noted to be 157/79 here.  Throughout her time in the ER, this remained stable staying around 142/76.  Labs are reassuring as well as she does not have a leukocytosis, troponin is stable x 2, no electrolyte derangements.  Urine does show evidence of possible infection however, with large leukocyte esterase along with bacteria and white blood cells present.  Urine will be sent to culture for further evaluation.  Provided patient with IV Rocephin in the ER.  I suspect hypotension readings may have been inaccurate given her reassuring vitals here.  Rx for Keflex sent to patient's pharmacy and I encouraged her to follow-up with her primary care provider within the week for reassessment.  Blood pressure cuff may need to be calibrated or may be faulty, encouraged her to bring this to her primary care visit for  comparison with her blood pressure cuffs.  Reasons to return to ER discussed.  Patient and  feel comfortable discharge home.    Disposition   The patient was discharged.     Diagnosis     ICD-10-CM    1. Blood pressure check  Z01.30       2. Lightheadedness  R42       3. UTI (urinary tract infection)  N39.0            Discharge Medications   New Prescriptions    CEPHALEXIN (KEFLEX) 500 MG CAPSULE    Take 1 capsule (500 mg) by mouth 2 times daily for 5 days         AILEEN Draper Lauren R, PA-C  08/19/24 1637

## 2024-08-20 ENCOUNTER — TELEPHONE (OUTPATIENT)
Dept: NURSING | Facility: CLINIC | Age: 84
End: 2024-08-20
Payer: COMMERCIAL

## 2024-08-20 LAB — BACTERIA UR CULT: NORMAL

## 2024-08-20 NOTE — TELEPHONE ENCOUNTER
[facility]    Reason for call: Lab Result Notification     Lab Result (including Rx patient on, if applicable).  If culture, copy of lab report at bottom.  Lab Result: Urine Culture  8/19/24 Prescribed CephALEXin (KEFLEX) 500 MG capsule Take 1 capsule (500 mg) by mouth 2 times daily for 5 days - Oral     Creatinine Level (mg/dl)   Creatinine   Date Value Ref Range Status   08/19/2024 1.07 (H) 0.51 - 0.95 mg/dL Final   03/15/2020 0.88 0.52 - 1.04 mg/dL Final    Creatinine clearance (ml/min), if applicable    Serum creatinine: 1.07 mg/dL (H) 08/19/24 1355  Estimated creatinine clearance: 42.9 mL/min (A)     RN Recommendations/Instructions per [place] ED lab result protocol:   [facility] ED lab result protocol utilized: Urine Culture  Advise to discontinue current antibiotic.       Patient's current Symptoms:   Pt states no UTI symptoms and will discontinue antibiotic    Patient/care giver notified to contact your PCP clinic or return to the Emergency department if your:  Symptoms return.  Symptoms worsen or other concerning symptoms.       Maddy Christensen RN

## 2024-10-01 NOTE — LETTER
4/11/2018         RE: Julieta Holm  [address]   [facility] [address]        Dear Colleague,    Thank you for referring your patient, Julieta Hlom, to the [facility]. Please see a copy of my visit note below.    [facility] - PRIMARY CARE SKIN    CC : skin cancer screening (full-body)  SUBJECTIVE:                                                    Julieta Holm is a 77 year old female who presents to clinic today for a full-body skin exam because of a lesion on the right face.    Bothersome lesions noticed by the patient or other skin concerns :  Issue One : A lesion on the right face is dark in color.  Changing color : YES.    Personal history of skin cancer : NO - she has had lesions on the face treated with cryotherapy.  Family history of skin cancer : NO.    Sun Exposure History  Previous history of significant sun exposure: [place].    Occupation : retired,  (indoor).    Refer to electronic medical record (EMR) for past medical history and medications.    INTEGUMENTARY/SKIN: POSITIVE for changing lesions  ROS : 14 point review of systems was negative except the symptoms listed above in the HPI.    This document serves as a record of the services and decisions personally performed and made by Danielle Govea MD. It was created on her behalf by Naseem Khan, a trained medical scribe.  The creation of this document is based on the scribe's personal observations and the provider's statements to the medical scribe.  Naseem Khan, April 11, 2018 9:49 AM      OBJECTIVE:                                                    GENERAL: healthy, alert and no distress  SKIN: Kulkarni Skin Type - I.  This patient was examined from the top of the head to the bottom of the feet  including scalp, face, neck, back, chest, breasts, buttocks, both arms, both legs, both hands, both feet, all 10 fingers and all 10 toes. The dermatoscope was used to help  evaluate pigmented lesions.  Skin Pertinent Findings:  Face : Multiple brown, macule(s) most consistent with benign solar lentigo.    Right arm(s) and left arm(s) : Multiple brown, macule(s) most consistent with benign solar lentigo.    Anterior legs : Multiple brown, macule(s) most consistent with benign solar lentigo. Some stuck-on appearing papules, raised, brown, coarse-textured, round lesion(s) most consistent with seborrheic keratoses. Stucco keratoses.    Back : stuck-on appearing papules, raised, brown, coarse-textured, round lesion(s) most consistent with seborrheic keratoses. brown, macule(s) most consistent with benign solar lentigo. slightly raised, red lesion(s) consistent with capillary hemangioma.    Posterior legs : stucco keratoses.    Significant Findings:  Face : 3 mm in size, erythematous, scaly, non-indurated lesion(s) most consistent with actinic keratoses.  Arms : hypertrophic actinic keratoses.  Name : Liquid Nitrogen Cry-Ac Cryotherapy.  Indication : Pre-malignant lesion.  Location(s) : left lateral face - x1, superior to right upper lip - x1, right jawline - x1, right dorsal wrist - x1; right mid-forearm - x1 .  Completed by : Danielle Govea MD  Note : Discussed natural history of lesion and treatment options. Prior to treatment, we discussed inflammation, tenderness post-procedure, the healing process, and the risks of pain, infection, scarring, blistering, and hypo-/hyperpigmentation after healing. Explained that these lesions may grow back and may need additional treatment or re-treatment. The patient expressed a desire to proceed with cryotherapy.    The lesion(s) was treated with liquid nitrogen Cry-Ac, five second freeze repeated twice with a pause to allow for the area to thaw. If this lesion should recur, then it needs to be re-evaluated.    Patient tolerated the procedure well and left in good condition.  Total number of lesions treated : 5.    Diagnostic Test Results:  none            ASSESSMENT:                                                      Encounter Diagnoses   Name Primary?     AK (actinic keratosis) Yes     Seborrheic keratoses      Solar lentigo      Stucco keratoses          PLAN:                                                    Patient Instructions   FUTURE APPOINTMENTS  Follow up in 1 year(s) for a full-body skin cancer screening.    ACTINIC KERATOSES POST-TREATMENT CARE INSTRUCTIONS  Actinic keratoses are benign, scaly or gritty lesions that appear in sun-exposed areas and may progress to skin cancers. For this reason, it is important to treat them before they become cancerous. Liquid nitrogen is the most commonly used and most effective treatment for actinic keratoses; it is mildly uncomfortable when applied to the skin, but the discomfort rapidly subsides.    Post-Treatment:  You may experience burning and/or stinging immediately following the procedure. The discomfort from the procedure may persist over the next 12-24 hours. The area treated will look pinker and slightly swollen before the healing process begins. You may also notice redness, swelling, tenderness, weeping and crusts or scabs. Healing time is approximately 10-14 days.    Blister - You may or may notice blistering from the freezing. If you develop an uncomfortable blister from today's treatment, you may gently puncture this with a needle that has been cleaned with alcohol. However, do not remove the protective skin layer of the blister.    Scab - After a few days, you may notice scaliness or scab formation. Do not pick at the scabs because this may cause slower healing and a permanent scar.    The skin may appear temporarily darker at the treatment site, but this usually fades over a period of months, provided that the area is protected from the sun.    Care of the areas treated:    Wash the area with a mild cleanser.    Gently pat dry.    Do not rub.     Keep protected from the sun during the healing  "process and for a full year following treatment as the skin continues to remodel during this time.    Apply Vaseline or Aquaphor ointment sparingly to the site for the first 7 days after treatment.    Do not use Neosporin, as many people eventually develop a medication allergy, that can easily be confused with an infection, to Neomycin.    Return if:  There should not be any residual scaling. If there is any concern that the lesion has persisted after 4-6 weeks, make an appointment for a re-check. Healing time does vary depending on your individual healing process and the area of the body treated. Most patients will be healed in one month.    Signs of Infection:  Thankfully this is rare. However if you notice persistent colored drainage, increasing pain, fever or other signs of infection, please call us at: [phone]    SUN PROTECTION INSTRUCTIONS  Sun damage can lead to skin cancer and premature aging of the skin.      The best way to protect from sun damage to your skin is to avoid the sun during peak hours (10 am - 2 pm) even on overcast days.      Use UPF sun-protective clothing, which while more expensive initially provides longer lasting coverage without having to worry about remembering to re-apply.  1. Wear a wide-brimmed hat and sunglasses.   2. Wear sun-protective clothing.  [employer] and other [employer] make sun protective clothing that are stylish, comfortable and cool. Vinny and other [employer] make UV arm sleeves suitable for golfing, gardening and other activities.      Sunscreen instructions:  1. Use sunscreens with Zinc Oxide, Titanium Dioxide or Avobenzone to protect from UVA rays.  2. Use SPF 30-50+ to protect from UVB rays.  3. Re-apply every 2 hours even if water resistant.  4. Apply on your face every day even when cloudy and even in the winter. UVA \"aging rays\" penetrate window glass and are just as strong in the winter as in the summer.    Product " "Recommendations:    Good examples include: Blue Lizard, EltaMD, Solbar    Good daily moisturizers with SPF: Vanicream, CeraVe.    For sensitive skin, consider : SkinMedica Essential Defense Mineral Shield Broad Spectrum SPF 35      Never use tanning beds. Tanning beds are associated with much higher risks of skin cancer.    All tanning damages the skin. Aim for ivory skin year round and you will have less trouble with your skin in years to come. There is no merit in getting \"a base tan\" before a warm weather vacation, as any tanning indicates your body's response to sun damage.    Stop smoking. Smokers have higher rates of skin cancer and also have premature skin wrinkling.    FYI  You should use about 3 tablespoons of sunscreen to protect your whole body. Thus a typical eight ounce bottle of sunscreen should last 4 applications. Remember, that the SPF rating is compromised if you don t apply enough. Most people only apply 1/2 - 1/3 of the amount they need. Also don t forget areas such as your ears, feet, upper back and harder to reach places. Keep in mind that these amounts should be increased for larger body sizes.    Sunscreens with titanium dioxide and/or zinc oxide in the active ingredients are physical blockers as opposed to chemical blockers. Chemical-free sunscreens should not irritate the skin.    Spray-on sunscreens may be used for touch-up application only, not as a base layer. Also, use with caution around small children due to inhalation risk.    Avoid retinyl palmitate products.    Avoid combination products that include both sunscreen and insect repellant, as sunscreen should be applied every 2 hours, but insect repellant should not be applied as frequently.    SPF means sun protection factor, which is just the degree to which the sunscreen can protect against UVB rays. There is no rating system for UVA rays. SPF is calculated as the time skin will burn when sunscreen is applied vs. skin without " sunscreen.    Water resistant sunscreens should be re-applied every 1-2 hours.    For more information:  http://www.skincancer.org/prevention/sun-protection/sunscreen/sunscreens-safe-and-effective    The patient was counseled about sunscreens and sun avoidance. The patient was counseled to check the skin regularly and report any lesion that is new, changing, itching, scabbing, bleeding or otherwise bothersome. The patient was discharged ambulatory and in stable condition.    Educational brochures given to patient : skin cancer.       PROCEDURES:                                                    None.    TT : 25 minutes.  CT : 15 minutes.      The information in this document, created by the medical scribe for me, accurately reflects the services I personally performed and the decisions made by me. I have reviewed and approved this document for accuracy prior to leaving the patient care area.  Danielle Govea MD April 11, 2018 9:49 AM  [facility]    Again, thank you for allowing me to participate in the care of your patient.        Sincerely,        Lynn Govea MD     97

## 2025-04-15 ENCOUNTER — HOSPITAL ENCOUNTER (EMERGENCY)
Facility: CLINIC | Age: 85
Discharge: HOME | End: 2025-04-15
Attending: EMERGENCY MEDICINE | Admitting: EMERGENCY MEDICINE
Payer: COMMERCIAL

## 2025-04-15 ENCOUNTER — APPOINTMENT (OUTPATIENT)
Dept: GENERAL RADIOLOGY | Facility: CLINIC | Age: 85
End: 2025-04-15
Attending: EMERGENCY MEDICINE
Payer: COMMERCIAL

## 2025-04-15 VITALS
RESPIRATION RATE: 18 BRPM | OXYGEN SATURATION: 99 % | TEMPERATURE: 97.7 F | HEIGHT: 64 IN | DIASTOLIC BLOOD PRESSURE: 72 MMHG | HEART RATE: 58 BPM | WEIGHT: 190 LBS | BODY MASS INDEX: 32.44 KG/M2 | SYSTOLIC BLOOD PRESSURE: 158 MMHG

## 2025-04-15 DIAGNOSIS — R07.89 ATYPICAL CHEST PAIN: ICD-10-CM

## 2025-04-15 LAB
ANION GAP SERPL CALCULATED.3IONS-SCNC: 11 MMOL/L (ref 7–15)
BASOPHILS # BLD AUTO: 0 10E3/UL (ref 0–0.2)
BASOPHILS NFR BLD AUTO: 1 %
BUN SERPL-MCNC: 17.9 MG/DL (ref 8–23)
CALCIUM SERPL-MCNC: 9.4 MG/DL (ref 8.8–10.4)
CHLORIDE SERPL-SCNC: 101 MMOL/L (ref 98–107)
CREAT SERPL-MCNC: 1.09 MG/DL (ref 0.51–0.95)
D DIMER PPP FEU-MCNC: 0.56 UG/ML FEU (ref 0–0.5)
EGFRCR SERPLBLD CKD-EPI 2021: 50 ML/MIN/1.73M2
EOSINOPHIL # BLD AUTO: 0.4 10E3/UL (ref 0–0.7)
EOSINOPHIL NFR BLD AUTO: 7 %
ERYTHROCYTE [DISTWIDTH] IN BLOOD BY AUTOMATED COUNT: 12.6 % (ref 10–15)
GLUCOSE SERPL-MCNC: 105 MG/DL (ref 70–99)
HCO3 SERPL-SCNC: 26 MMOL/L (ref 22–29)
HCT VFR BLD AUTO: 39 % (ref 35–47)
HGB BLD-MCNC: 12.9 G/DL (ref 11.7–15.7)
HOLD SPECIMEN: NORMAL
IMM GRANULOCYTES # BLD: 0 10E3/UL
IMM GRANULOCYTES NFR BLD: 0 %
LYMPHOCYTES # BLD AUTO: 1.5 10E3/UL (ref 0.8–5.3)
LYMPHOCYTES NFR BLD AUTO: 26 %
MCH RBC QN AUTO: 31.7 PG (ref 26.5–33)
MCHC RBC AUTO-ENTMCNC: 33.1 G/DL (ref 31.5–36.5)
MCV RBC AUTO: 96 FL (ref 78–100)
MONOCYTES # BLD AUTO: 0.5 10E3/UL (ref 0–1.3)
MONOCYTES NFR BLD AUTO: 9 %
NEUTROPHILS # BLD AUTO: 3.2 10E3/UL (ref 1.6–8.3)
NEUTROPHILS NFR BLD AUTO: 57 %
NRBC # BLD AUTO: 0 10E3/UL
NRBC BLD AUTO-RTO: 0 /100
PLATELET # BLD AUTO: 258 10E3/UL (ref 150–450)
POTASSIUM SERPL-SCNC: 4 MMOL/L (ref 3.4–5.3)
RBC # BLD AUTO: 4.07 10E6/UL (ref 3.8–5.2)
SODIUM SERPL-SCNC: 138 MMOL/L (ref 135–145)
TROPONIN T SERPL HS-MCNC: 11 NG/L
TROPONIN T SERPL HS-MCNC: 11 NG/L
WBC # BLD AUTO: 5.6 10E3/UL (ref 4–11)

## 2025-04-15 PROCEDURE — 85379 FIBRIN DEGRADATION QUANT: CPT | Performed by: EMERGENCY MEDICINE

## 2025-04-15 PROCEDURE — 80048 BASIC METABOLIC PNL TOTAL CA: CPT | Performed by: EMERGENCY MEDICINE

## 2025-04-15 PROCEDURE — 84484 ASSAY OF TROPONIN QUANT: CPT | Performed by: EMERGENCY MEDICINE

## 2025-04-15 PROCEDURE — 85004 AUTOMATED DIFF WBC COUNT: CPT | Performed by: EMERGENCY MEDICINE

## 2025-04-15 PROCEDURE — 85041 AUTOMATED RBC COUNT: CPT | Performed by: EMERGENCY MEDICINE

## 2025-04-15 PROCEDURE — 71046 X-RAY EXAM CHEST 2 VIEWS: CPT

## 2025-04-15 PROCEDURE — 93005 ELECTROCARDIOGRAM TRACING: CPT

## 2025-04-15 PROCEDURE — 36415 COLL VENOUS BLD VENIPUNCTURE: CPT | Performed by: EMERGENCY MEDICINE

## 2025-04-15 PROCEDURE — 99285 EMERGENCY DEPT VISIT HI MDM: CPT | Mod: 25

## 2025-04-15 RX ORDER — SUCRALFATE ORAL 1 G/10ML
1 SUSPENSION ORAL 4 TIMES DAILY
Qty: 414 ML | Refills: 0 | Status: SHIPPED | OUTPATIENT
Start: 2025-04-15

## 2025-04-15 ASSESSMENT — ACTIVITIES OF DAILY LIVING (ADL)
ADLS_ACUITY_SCORE: 41

## 2025-04-15 ASSESSMENT — COLUMBIA-SUICIDE SEVERITY RATING SCALE - C-SSRS
2. HAVE YOU ACTUALLY HAD ANY THOUGHTS OF KILLING YOURSELF IN THE PAST MONTH?: NO
6. HAVE YOU EVER DONE ANYTHING, STARTED TO DO ANYTHING, OR PREPARED TO DO ANYTHING TO END YOUR LIFE?: NO
1. IN THE PAST MONTH, HAVE YOU WISHED YOU WERE DEAD OR WISHED YOU COULD GO TO SLEEP AND NOT WAKE UP?: NO

## 2025-04-15 NOTE — ED PROVIDER NOTES
Emergency Department Note      History of Present Illness     Chief Complaint   Chest Pain      HPI   Julieta Holm is a 84 year old female with history of stroke, stage 3 CKD, hypertension, and hyperlipidemia who presents for evaluation of chest pain. The patient states that since March she has been dealing with an intermittent fluttering sensation over her heart along with left sided burning pain. She wore a halter monitor for 2 weeks where she was diagnosed with SVT. The burning sensation only lasts for a few seconds at a time, but has recently been occurring more frequently. Today the burning pain didn't go away, so the patient thought she should come in and be evaluated for her symptoms. Nothing seems to trigger her pain and it can happen at any time of the day after 0900. The pain resolves on its own. She denies any shortness of breath or worsening pain when walking. She has previously failed a stress test where she was unable to complete it, but this was many years ago. She is scheduled to have a cardiac catheterization done in June.    Independent Historian   None    Review of External Notes       Past Medical History     Medical History and Problem List   Past Medical History:   Diagnosis Date    Cerebellar stroke, acute (H) 3/2013    Chronic constipation     CKD (chronic kidney disease) stage 3, GFR 30-59 ml/min (H) 4/2014    Essential hypertension, benign 2001    Mixed hyperlipidemia     KELLY (obstructive sleep apnea) 2013    Osteopenia 2015    Other chest pain 4/2003    Rheumatic fever without mention of heart involvement 1972    Vertebral artery stenosis 3/2013       Medications   amLODIPine (NORVASC) 2.5 MG tablet  aspirin 81 MG tablet  atorvastatin (LIPITOR) 40 MG tablet  calcium 600 MG tablet  carboxymethylcellulose (CARBOXYMETHYLCELLULOSE SODIUM) 0.5 % SOLN ophthalmic solution  Cholecalciferol (VITAMIN D3 PO)  clopidogrel (PLAVIX) 75 MG tablet  FISH OIL 1000 MG OR  "CAPS  losartan-hydrochlorothiazide (HYZAAR) 50-12.5 MG per tablet  meclizine (ANTIVERT) 25 MG tablet  sodium chloride (FRANCOIS 128) 2 % ophthalmic solution  sucralfate (CARAFATE) 1 GM/10ML suspension  valACYclovir (VALTREX) 1000 mg tablet        Surgical History   Past Surgical History:   Procedure Laterality Date    C VAGINAL HYSTERECTOMY  1982    menometrorrhagia.  Had a laparoscpy prior to this procedure    CATARACT IOL, RT/LT  9/2008    bilateral    COLONOSCOPY  9/2001    patient told to repeat in 10 years    HC DILATION/CURETTAGE DIAG/THER NON OB      D&C's    HC LAPAROSCOPY, SURGICAL; CHOLECYSTECTOMY  2002    Cholecystectomy, Laparoscopic    HYSTERECTOMY, PAP NO LONGER INDICATED  1982    TONSILLECTOMY  1948       Physical Exam     Patient Vitals for the past 24 hrs:   BP Temp Temp src Pulse Resp SpO2 Height Weight   04/15/25 1857 (!) 147/73 -- -- -- -- -- -- --   04/15/25 1856 -- -- -- -- -- -- 1.626 m (5' 4\") 86.2 kg (190 lb)   04/15/25 1800 (!) 170/76 97.7  F (36.5  C) Oral 60 18 98 % -- --     Physical Exam  Vitals reviewed.   HENT:      Head: Normocephalic.   Eyes:      Pupils: Pupils are equal, round, and reactive to light.   Cardiovascular:      Rate and Rhythm: Normal rate and regular rhythm.      Heart sounds: Normal heart sounds.   Pulmonary:      Breath sounds: Normal breath sounds.   Musculoskeletal:         General: Normal range of motion.   Skin:     General: Skin is warm.      Capillary Refill: Capillary refill takes less than 2 seconds.   Neurological:      General: No focal deficit present.      Mental Status: She is alert and oriented to person, place, and time.   Psychiatric:         Mood and Affect: Mood normal.           Diagnostics     Lab Results   Labs Ordered and Resulted from Time of ED Arrival to Time of ED Departure   BASIC METABOLIC PANEL - Abnormal       Result Value    Sodium 138      Potassium 4.0      Chloride 101      Carbon Dioxide (CO2) 26      Anion Gap 11      Urea Nitrogen " 17.9      Creatinine 1.09 (*)     GFR Estimate 50 (*)     Calcium 9.4      Glucose 105 (*)    D DIMER QUANTITATIVE - Abnormal    D-Dimer Quantitative 0.56 (*)    TROPONIN T, HIGH SENSITIVITY - Normal    Troponin T, High Sensitivity 11     TROPONIN T, HIGH SENSITIVITY - Normal    Troponin T, High Sensitivity 11     CBC WITH PLATELETS AND DIFFERENTIAL    WBC Count 5.6      RBC Count 4.07      Hemoglobin 12.9      Hematocrit 39.0      MCV 96      MCH 31.7      MCHC 33.1      RDW 12.6      Platelet Count 258      % Neutrophils 57      % Lymphocytes 26      % Monocytes 9      % Eosinophils 7      % Basophils 1      % Immature Granulocytes 0      NRBCs per 100 WBC 0      Absolute Neutrophils 3.2      Absolute Lymphocytes 1.5      Absolute Monocytes 0.5      Absolute Eosinophils 0.4      Absolute Basophils 0.0      Absolute Immature Granulocytes 0.0      Absolute NRBCs 0.0         Imaging   Chest XR,  PA & LAT   Final Result   IMPRESSION: Lungs are clear. No effusions. Heart size is normal. Large hiatal hernia. No acute osseous findings.          EKG   ECG results from 04/15/25   EKG 12 lead     Value    Systolic Blood Pressure     Diastolic Blood Pressure     Ventricular Rate 56    Atrial Rate 56    AZ Interval 190    QRS Duration 82        QTc 413    P Axis 45    R AXIS 14    T Axis 32    Interpretation ECG      Sinus bradycardia  Otherwise normal ECG  EKG interpreted by me at 1852       Independent Interpretation   CXR: No infiltrate.    ED Course      Medications Administered   Medications - No data to display    Procedures   Procedures     Discussion of Management   None    ED Course   ED Course as of 04/15/25 2041   Tue Apr 15, 2025   1840 I obtained the history and examined the patient as noted above.      1958 I rechecked and updated the patient.      2040 We discussed plans for discharge and patient is comfortable with this.        Additional Documentation  None    Medical Decision Making / Diagnosis     CMS  Diagnoses: None    MIPS       None    MDM   Julieta Holm is a 84 year old female presents with intermittent chest pains lasting a few seconds.  Patient had a history of prior SVT but more palpitation related now discomfort burning discomfort in the left axillary region.  Patient is well-appearing.  EKG and troponins are negative has had a prior stress test but was able do it do it physically.  Is scheduled for an angiogram no needs and to expedite this due to lack of exertional chest pain normal EKG and negative troponins x 2.  Care was discussed with the patient will trial Carafate due to x-ray evidence of hiatal hernia and follow-up with primary care.  Patient is encouraged to return if chest pain is more constant exist associated with exertion or is much more severe.    Disposition   The patient was discharged.     Diagnosis     ICD-10-CM    1. Atypical chest pain  R07.89            Discharge Medications   New Prescriptions    SUCRALFATE (CARAFATE) 1 GM/10ML SUSPENSION    Take 10 mLs (1 g) by mouth 4 times daily.     Scribe Disclosure:  I, Mick Massey, am serving as a scribe at 6:52 PM on 4/15/2025 to document services personally performed by Raji Reynolds MD based on my observations and the provider's statements to me.        Raji Reynolds MD  04/15/25 4350

## 2025-04-15 NOTE — ED TRIAGE NOTES
Pt BIBA from home- pt has been wearing heart monitor for a few weeks- noticed burning chest sensation for 10 days that comes and goes. Pt was called by Cardiologist to come to ER d/t  SVT. H/o stroke on plavix. Pt reports relief from burning with complete rest only     Triage Assessment (Adult)       Row Name 04/15/25 0536          Triage Assessment    Airway WDL WDL        Respiratory WDL    Respiratory WDL WDL        Skin Circulation/Temperature WDL    Skin Circulation/Temperature WDL WDL        Cardiac WDL    Cardiac WDL X;rhythm     Pulse Rate & Regularity bradycardic        Peripheral/Neurovascular WDL    Peripheral Neurovascular WDL WDL        Cognitive/Neuro/Behavioral WDL    Cognitive/Neuro/Behavioral WDL WDL

## 2025-04-16 LAB
ATRIAL RATE - MUSE: 56 BPM
DIASTOLIC BLOOD PRESSURE - MUSE: NORMAL MMHG
INTERPRETATION ECG - MUSE: NORMAL
P AXIS - MUSE: 45 DEGREES
PR INTERVAL - MUSE: 190 MS
QRS DURATION - MUSE: 82 MS
QT - MUSE: 428 MS
QTC - MUSE: 413 MS
R AXIS - MUSE: 14 DEGREES
SYSTOLIC BLOOD PRESSURE - MUSE: NORMAL MMHG
T AXIS - MUSE: 32 DEGREES
VENTRICULAR RATE- MUSE: 56 BPM

## 2025-04-16 NOTE — DISCHARGE INSTRUCTIONS
We have done a workup for chest pain including lab work and chest x-ray.  We have identified a hiatal hernia but no other cause for pain.  As we have discussed brief episodes of chest pain that lasts seconds and resolve on their own that are not exertional related sound atypical for the heart.  You have a scheduled heart cath in June.  We have no reason as of yet to expedite this however if you develop severe constant chest pain that is unrelenting if you develop chest pain that is triggered with walking or exercise if you develop severe increase in shortness of breath return to the emergency room for reassessment.  Otherwise follow-up for your scheduled angiogram.  We are trialing Carafate due to your identified hiatal hernia as an esophageal cause for pain.